# Patient Record
Sex: FEMALE | Race: BLACK OR AFRICAN AMERICAN | Employment: FULL TIME | URBAN - METROPOLITAN AREA
[De-identification: names, ages, dates, MRNs, and addresses within clinical notes are randomized per-mention and may not be internally consistent; named-entity substitution may affect disease eponyms.]

---

## 2017-01-05 RX ORDER — VALSARTAN 160 MG/1
160 TABLET ORAL DAILY
Qty: 30 TAB | Refills: 11 | Status: SHIPPED | OUTPATIENT
Start: 2017-01-05 | End: 2017-01-07 | Stop reason: SDUPTHER

## 2017-01-07 RX ORDER — VALSARTAN 160 MG/1
160 TABLET ORAL DAILY
Qty: 30 TAB | Refills: 11 | Status: SHIPPED | OUTPATIENT
Start: 2017-01-07 | End: 2018-02-02 | Stop reason: SDUPTHER

## 2017-03-21 ENCOUNTER — APPOINTMENT (OUTPATIENT)
Dept: GENERAL RADIOLOGY | Age: 57
End: 2017-03-21
Attending: EMERGENCY MEDICINE
Payer: COMMERCIAL

## 2017-03-21 ENCOUNTER — HOSPITAL ENCOUNTER (EMERGENCY)
Age: 57
Discharge: HOME | End: 2017-03-21
Attending: EMERGENCY MEDICINE
Payer: COMMERCIAL

## 2017-03-21 ENCOUNTER — APPOINTMENT (OUTPATIENT)
Dept: CT IMAGING | Age: 57
End: 2017-03-21
Attending: EMERGENCY MEDICINE
Payer: COMMERCIAL

## 2017-03-21 VITALS
TEMPERATURE: 98.4 F | DIASTOLIC BLOOD PRESSURE: 70 MMHG | SYSTOLIC BLOOD PRESSURE: 135 MMHG | RESPIRATION RATE: 16 BRPM | WEIGHT: 148.4 LBS | HEART RATE: 74 BPM | BODY MASS INDEX: 27.31 KG/M2 | HEIGHT: 62 IN | OXYGEN SATURATION: 97 %

## 2017-03-21 DIAGNOSIS — R00.2 PALPITATIONS: Primary | ICD-10-CM

## 2017-03-21 LAB
ALBUMIN SERPL BCP-MCNC: 4.2 G/DL (ref 3.5–5)
ALBUMIN/GLOB SERPL: 0.9 {RATIO} (ref 1.1–2.2)
ALP SERPL-CCNC: 73 U/L (ref 45–117)
ALT SERPL-CCNC: 22 U/L (ref 12–78)
ANION GAP BLD CALC-SCNC: 9 MMOL/L (ref 5–15)
AST SERPL W P-5'-P-CCNC: 22 U/L (ref 15–37)
ATRIAL RATE: 90 BPM
BASOPHILS # BLD AUTO: 0 K/UL (ref 0–0.1)
BASOPHILS # BLD: 0 % (ref 0–1)
BILIRUB SERPL-MCNC: 1 MG/DL (ref 0.2–1)
BUN SERPL-MCNC: 21 MG/DL (ref 6–20)
BUN/CREAT SERPL: 23 (ref 12–20)
CALCIUM SERPL-MCNC: 9.5 MG/DL (ref 8.5–10.1)
CALCULATED P AXIS, ECG09: 73 DEGREES
CALCULATED R AXIS, ECG10: 85 DEGREES
CALCULATED T AXIS, ECG11: 44 DEGREES
CHLORIDE SERPL-SCNC: 97 MMOL/L (ref 97–108)
CO2 SERPL-SCNC: 27 MMOL/L (ref 21–32)
CREAT SERPL-MCNC: 0.9 MG/DL (ref 0.55–1.02)
DIAGNOSIS, 93000: NORMAL
EOSINOPHIL # BLD: 0 K/UL (ref 0–0.4)
EOSINOPHIL NFR BLD: 0 % (ref 0–7)
ERYTHROCYTE [DISTWIDTH] IN BLOOD BY AUTOMATED COUNT: 12.1 % (ref 11.5–14.5)
GLOBULIN SER CALC-MCNC: 4.7 G/DL (ref 2–4)
GLUCOSE SERPL-MCNC: 88 MG/DL (ref 65–100)
HCT VFR BLD AUTO: 44.3 % (ref 35–47)
HGB BLD-MCNC: 14.5 G/DL (ref 11.5–16)
LYMPHOCYTES # BLD AUTO: 13 % (ref 12–49)
LYMPHOCYTES # BLD: 0.9 K/UL (ref 0.8–3.5)
MCH RBC QN AUTO: 33.3 PG (ref 26–34)
MCHC RBC AUTO-ENTMCNC: 32.7 G/DL (ref 30–36.5)
MCV RBC AUTO: 101.8 FL (ref 80–99)
MONOCYTES # BLD: 0.5 K/UL (ref 0–1)
MONOCYTES NFR BLD AUTO: 7 % (ref 5–13)
NEUTS SEG # BLD: 5.1 K/UL (ref 1.8–8)
NEUTS SEG NFR BLD AUTO: 80 % (ref 32–75)
P-R INTERVAL, ECG05: 196 MS
PLATELET # BLD AUTO: 284 K/UL (ref 150–400)
POTASSIUM SERPL-SCNC: 4 MMOL/L (ref 3.5–5.1)
PROT SERPL-MCNC: 8.9 G/DL (ref 6.4–8.2)
Q-T INTERVAL, ECG07: 364 MS
QRS DURATION, ECG06: 84 MS
QTC CALCULATION (BEZET), ECG08: 445 MS
RBC # BLD AUTO: 4.35 M/UL (ref 3.8–5.2)
SODIUM SERPL-SCNC: 133 MMOL/L (ref 136–145)
TROPONIN I SERPL-MCNC: <0.04 NG/ML
TSH SERPL DL<=0.05 MIU/L-ACNC: 1.34 UIU/ML (ref 0.36–3.74)
VENTRICULAR RATE, ECG03: 90 BPM
WBC # BLD AUTO: 6.5 K/UL (ref 3.6–11)

## 2017-03-21 PROCEDURE — 84443 ASSAY THYROID STIM HORMONE: CPT | Performed by: EMERGENCY MEDICINE

## 2017-03-21 PROCEDURE — 99283 EMERGENCY DEPT VISIT LOW MDM: CPT

## 2017-03-21 PROCEDURE — 93005 ELECTROCARDIOGRAM TRACING: CPT

## 2017-03-21 PROCEDURE — 84484 ASSAY OF TROPONIN QUANT: CPT | Performed by: EMERGENCY MEDICINE

## 2017-03-21 PROCEDURE — 36415 COLL VENOUS BLD VENIPUNCTURE: CPT | Performed by: EMERGENCY MEDICINE

## 2017-03-21 PROCEDURE — 71250 CT THORAX DX C-: CPT

## 2017-03-21 PROCEDURE — 71020 XR CHEST PA LAT: CPT

## 2017-03-21 PROCEDURE — 80053 COMPREHEN METABOLIC PANEL: CPT | Performed by: EMERGENCY MEDICINE

## 2017-03-21 PROCEDURE — 85025 COMPLETE CBC W/AUTO DIFF WBC: CPT | Performed by: EMERGENCY MEDICINE

## 2017-03-21 RX ORDER — LEVOFLOXACIN 750 MG/1
750 TABLET ORAL DAILY
Qty: 7 TAB | Refills: 0 | Status: SHIPPED | OUTPATIENT
Start: 2017-03-21 | End: 2017-03-28

## 2017-03-21 NOTE — ED PROVIDER NOTES
HPI Comments: 56 y.o. female with past medical history significant for HTN, sarcoidosis, prediabetes, PAF, sinus congestion, and s/p colonoscopy who presents from home via private vehicle with chief complaint of palpitations. Pt reports that she woke up \"anxious\" yesterday morning. She went to work but reports \"heavy pounding\" in her chest and that she could see her heart beating. +Associated nausea yesterday. She reports that these palpitations continued throughout the night last night and today. They last for a few hours at a time. Her last episode was 4.5 hours ago. Pt denies any pain, but reports an intermittent \"sharp something\" that \"is not pain\" \"through\" her chest and right side. She notes feeling a \"lump\" in her chest during these episodes of palpitations that feels like she could \"cough away\". Denies cough, however. Pt notes that she was seen for heart palpitations and was dx with A-fib. She is followed by Dr. Bethanie Rasmussen, cardiology, and had been on Eliquis. She has since been taken off Eliquis. Pt currently denies vomiting, rhinorrhea, sore throat, cough, and any urinary or bowel changes. There are no other acute medical concerns at this time. Social hx: Never smoker. Alcohol use. PCP: Sheldon Hollis MD    Note written by Sebastian Segura, as dictated by Nasrin Hooper MD 12:45 PM    The history is provided by the patient.         Past Medical History:   Diagnosis Date    Hypertension     PAF (paroxysmal atrial fibrillation) (HCC)     Prediabetes     S/P colonoscopy 2013    Sarcoidosis ([facility] [address]     Sinus congestion        Past Surgical History:   Procedure Laterality Date    HX COLONOSCOPY      HX ENDOSCOPY      HX GYN           Family History:   Problem Relation Age of Onset    Heart Disease Father        Social History     Social History    Marital status:      Spouse name: N/A    Number of children: N/A    Years of education: N/A     Occupational History    Not on file.     Social History Main Topics    Smoking status: Never Smoker    Smokeless tobacco: Never Used    Alcohol use 0.6 oz/week     1 Glasses of wine per week    Drug use: No    Sexual activity: Not on file     Other Topics Concern    Not on file     Social History Narrative    Family History: Mother: alive, HypertensionFather:  58 yrs, leukemia complicationsSister(s): alive, DMBrother(s): alive, ,    HTNGrandmother: HEENTGrandfather: [place] brother(s) , 1 sister(s) . 2 son(s) . Social History: Alcohol Use Patient uses alcohol, Drinks per occasion: 2, Drinks per w [place]: 2. Smoking Status Patient is a never smoker. Marital Status: , 11 yrs after 17 yr relationship. Lives w ith: spouse. 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 Education/School: has highschool diploma. ALLERGIES: Review of patient's allergies indicates no known allergies. Review of Systems   Constitutional: Negative for chills and fever. HENT: Negative for rhinorrhea and sore throat. Respiratory: Negative for cough and shortness of breath. Cardiovascular: Positive for palpitations. Negative for chest pain. +Chest discomfort   Gastrointestinal: Positive for nausea. Negative for abdominal pain, diarrhea and vomiting. Genitourinary: Negative for dysuria and urgency. Musculoskeletal: Negative for arthralgias and back pain. Skin: Negative for rash. Neurological: Negative for dizziness, weakness and light-headedness. All other systems reviewed and are negative.       Vitals:    17 1139   BP: 165/88   Pulse: 94   Resp: 16   Temp: 98.7 °F (37.1 °C)   SpO2: 99%   Weight: 67.3 kg (148 lb 6.4 oz)   Height: 5' 2\" (1.575 m)            Physical Exam     Const:  No acute distress, well developed, well nourished  Head:  Atraumatic, normocephalic  Eyes:  PERRL, conjunctiva normal, no scleral icterus  Neck:  Supple, trachea midline  Cardiovascular:  RRR, no murmurs, no gallops, no rubs  Resp:  No resp distress, no increased work of breathing, no wheezes, no rhonchi, no rales,  Abd:  Soft, non-tender, non-distended, no rebound, no guarding, no CVA tenderness  :  Deferred  MSK:  No pedal edema, normal ROM  Neuro:  Alert and oriented x3, no cranial nerve defect  Skin:  Warm, dry, intact  Psych: normal mood and affect, behavior is normal, judgement and thought content is normal    Note written by Sebastian Banda, as dictated by Melvin Nicolas MD 12:45 PM      MDM  Number of Diagnoses or Management Options  Palpitations:      Amount and/or Complexity of Data Reviewed  Clinical lab tests: ordered and reviewed  Tests in the radiology section of CPT®: ordered and reviewed  Review and summarize past medical records: yes    Patient Progress  Patient progress: stable    ED Course     Pt. Presents to the ER with palpitations. She has been symptom free in the ER. Chest xray and CT shows signs of infection/inflammation. I will start her on levofloxacin. No signs of arrhythmias on EKG. Pt. To f/u with her cardiologist and PCP or return to the ER with worsening sx. Procedures         EKG interpretation: (Preliminary)  Rhythm: normal sinus rhythm; and regular . Rate (approx.): 90; Axis: normal; P wave: normal; QRS interval: normal ; ST/T wave: non-specific changes; Other findings: borderline ekg.

## 2017-03-21 NOTE — DISCHARGE INSTRUCTIONS
Palpitations: Care Instructions  Your Care Instructions    Heart palpitations are the uncomfortable sensation that your heart is beating fast or irregularly. You might feel pounding or fluttering in your chest. It might feel like your heart is skipping a beat. Although palpitations may be caused by a heart problem, they also occur because of stress, fatigue, or use of alcohol, caffeine, or nicotine. Many medicines, including diet pills, antihistamines, decongestants, and some herbal products, can cause heart palpitations. Nearly everyone has palpitations from time to time. Depending on your symptoms, your doctor may need to do more tests to try to find the cause of your palpitations. Follow-up care is a key part of your treatment and safety. Be sure to make and go to all appointments, and call your doctor if you are having problems. It's also a good idea to know your test results and keep a list of the medicines you take. How can you care for yourself at home? · Avoid caffeine, nicotine, and excess alcohol. · Do not take illegal drugs, such as methamphetamines and cocaine. · Do not take weight loss or diet medicines unless you talk with your doctor first.  · Get plenty of sleep. · Do not overeat. · If you have palpitations again, take deep breaths and try to relax. This may slow a racing heart. · If you start to feel lightheaded, lie down to avoid injuries that might result if you pass out and fall down. · Keep a record of your palpitations and bring it to your next doctor's appointment. Write down:  ¨ The date and time. ¨ Your pulse. (If your heart is beating fast, it may be hard to count your pulse.)  ¨ What you were doing when the palpitations started. ¨ How long the palpitations lasted. ¨ Any other symptoms. · If an activity causes palpitations, slow down or stop. Talk to your doctor before you do that activity again. · Take your medicines exactly as prescribed.  Call your doctor if you think you are having a problem with your medicine. When should you call for help? Call 911 anytime you think you may need emergency care. For example, call if:  · You passed out (lost consciousness). · You have symptoms of a heart attack. These may include:  ¨ Chest pain or pressure, or a strange feeling in the chest.  ¨ Sweating. ¨ Shortness of breath. ¨ Pain, pressure, or a strange feeling in the back, neck, jaw, or upper belly or in one or both shoulders or arms. ¨ Lightheadedness or sudden weakness. ¨ A fast or irregular heartbeat. After you call 911, the  may tell you to chew 1 adult-strength or 2 to 4 low-dose aspirin. Wait for an ambulance. Do not try to drive yourself. · You have symptoms of a stroke. These may include:  ¨ Sudden numbness, tingling, weakness, or loss of movement in your face, arm, or leg, especially on only one side of your body. ¨ Sudden vision changes. ¨ Sudden trouble speaking. ¨ Sudden confusion or trouble understanding simple statements. ¨ Sudden problems with walking or balance. ¨ A sudden, severe headache that is different from past headaches. Call your doctor now or seek immediate medical care if:  · You have heart palpitations and:  ¨ Are dizzy or lightheaded, or you feel like you may faint. ¨ Have new or increased shortness of breath. Watch closely for changes in your health, and be sure to contact your doctor if:  · You continue to have heart palpitations. Where can you learn more? Go to http://gordon-bhavna.info/. Enter R508 in the search box to learn more about \"Palpitations: Care Instructions. \"  Current as of: January 27, 2016  Content Version: 11.1  © 0864-9896 [employer]. Care instructions adapted under license by [employer] (which disclaims liability or warranty for this information).  If you have questions about a medical condition or this instruction, always ask your healthcare professional. [employer] disclaims any warranty or liability for your use of this information. We hope that we have addressed all of your medical concerns. The examination and treatment you received in the Emergency Department were for an emergent problem and were not intended as complete care. It is important that you follow up with your healthcare provider(s) for ongoing care. If your symptoms worsen or do not improve as expected, and you are unable to reach your usual health care provider(s), you should return to the Emergency Department. Today's healthcare is undergoing tremendous change, and patient satisfaction surveys are one of the many tools to assess the quality of medical care. You may receive a survey from the [facility] regarding your experience in the Emergency Department. I hope that your experience has been completely positive, particularly the medical care that I provided. As such, please participate in the survey; anything less than excellent does not meet my expectations or intentions. [facility] and [address] [place] [address] participate in nationally recognized quality of care measures. If your blood pressure is greater than 120/80, as reported below, we urge that you seek medical care to address the potential of high blood pressure, commonly known as hypertension. Hypertension can be hereditary or can be caused by certain medical conditions, pain, stress, or \"white coat syndrome. \"       Please make an appointment with your health care provider(s) for follow up of your Emergency Department visit. VITALS:   Patient Vitals for the past 8 hrs:   Temp Pulse Resp BP SpO2   03/21/17 1403 - 72 16 138/74 98 %   03/21/17 1326 - 77 16 152/87 98 %   03/21/17 1139 98.7 °F (37.1 °C) 94 16 165/88 99 %          Thank you for allowing us to provide you with medical care today. We realize that you have many choices for your emergency care needs.   Please choose us in the future for any continued health care needs. [place], [facility].   Office: [phone]            Recent Results (from the past 24 hour(s))   EKG, 12 LEAD, INITIAL    Collection Time: 03/21/17 11:47 AM   Result Value Ref Range    Ventricular Rate 90 BPM    Atrial Rate 90 BPM    P-R Interval 196 ms    QRS Duration 84 ms    Q-T Interval 364 ms    QTC Calculation (Bezet) 445 ms    Calculated P Axis 73 degrees    Calculated R Axis 85 degrees    Calculated T Axis 44 degrees    Diagnosis       Normal sinus rhythm  When compared with ECG of 15-MARIAJOSE-2015 16:43,  GA interval has decreased  Confirmed by Ulysses Sifuentes MD, Giacomo ([id number]) on 3/21/2017 83:34:10 PM     METABOLIC PANEL, COMPREHENSIVE    Collection Time: 03/21/17 12:30 PM   Result Value Ref Range    Sodium 133 (L) 136 - 145 mmol/L    Potassium 4.0 3.5 - 5.1 mmol/L    Chloride 97 97 - 108 mmol/L    CO2 27 21 - 32 mmol/L    Anion gap 9 5 - 15 mmol/L    Glucose 88 65 - 100 mg/dL    BUN 21 (H) 6 - 20 MG/DL    Creatinine 0.90 0.55 - 1.02 MG/DL    BUN/Creatinine ratio 23 (H) 12 - 20      GFR est AA >60 >60 ml/min/1.73m2    GFR est non-AA >60 >60 ml/min/1.73m2    Calcium 9.5 8.5 - 10.1 MG/DL    Bilirubin, total 1.0 0.2 - 1.0 MG/DL    ALT (SGPT) 22 12 - 78 U/L    AST (SGOT) 22 15 - 37 U/L    Alk.  phosphatase 73 45 - 117 U/L    Protein, total 8.9 (H) 6.4 - 8.2 g/dL    Albumin 4.2 3.5 - 5.0 g/dL    Globulin 4.7 (H) 2.0 - 4.0 g/dL    A-G Ratio 0.9 (L) 1.1 - 2.2     TROPONIN I    Collection Time: 03/21/17 12:30 PM   Result Value Ref Range    Troponin-I, Qt. <0.04 <0.05 ng/mL   TSH 3RD GENERATION    Collection Time: 03/21/17 12:30 PM   Result Value Ref Range    TSH 1.34 0.36 - 3.74 uIU/mL   CBC WITH AUTOMATED DIFF    Collection Time: 03/21/17  1:05 PM   Result Value Ref Range    WBC 6.5 3.6 - 11.0 K/uL    RBC 4.35 3.80 - 5.20 M/uL    HGB 14.5 11.5 - 16.0 g/dL    HCT 44.3 35.0 - 47.0 %    .8 (H) 80.0 - 99.0 FL    MCH 33.3 26.0 - 34.0 PG    MCHC 32.7 30.0 - 36.5 g/dL    RDW 12.1 11.5 - 14.5 %    PLATELET 001 637 - 615 K/uL    NEUTROPHILS 80 (H) 32 - 75 %    LYMPHOCYTES 13 12 - 49 %    MONOCYTES 7 5 - 13 %    EOSINOPHILS 0 0 - 7 %    BASOPHILS 0 0 - 1 %    ABS. NEUTROPHILS 5.1 1.8 - 8.0 K/UL    ABS. LYMPHOCYTES 0.9 0.8 - 3.5 K/UL    ABS. MONOCYTES 0.5 0.0 - 1.0 K/UL    ABS. EOSINOPHILS 0.0 0.0 - 0.4 K/UL    ABS. BASOPHILS 0.0 0.0 - 0.1 K/UL       Xr Chest Pa Lat    Result Date: 3/21/2017  History: Palpitations. 2 views of the chest demonstrate unremarkable cardiomediastinal contours. There are reticular nodular opacities in the upper lobes bilaterally. There are no effusions and the osseous structures appear unremarkable. IMPRESSION: Reticulonodular opacities are seen in the upper lobes bilaterally. Strong clinical correlation is recommended. CT may be useful. Ct Chest Wo Cont    Result Date: 3/21/2017  Clinical history: Palpitations, nodules INDICATION: Palpitations, nodules COMPARISON: TECHNIQUE:  5 mm axial images were obtained through the chest. Coronal and sagittal reconstructions were generated. CT dose reduction was achieved through use of a standardized protocol tailored for this examination and automatic exposure control for dose modulation. The absence of intravenous contrast reduces the sensitivity for evaluation of the mediastinum and upper abdominal organs. FINDINGS: There are diffuse tree in bud nodules scattered throughout the lower and upper lobes with a upper lobe predominance. There are calcified mediastinal and hilar lymph nodes which suggest prior granulomatous infection. Paraesophageal adenopathy as well. Otherwise: THORACIC AORTA: No aneurysm. MAIN PULMONARY ARTERY: Normal in caliber. TRACHEA/BRONCHI: Patent. ESOPHAGUS: No wall thickening or dilatation. HEART: Normal in size. PLEURA: No effusion or pneumothorax. INCIDENTALLY IMAGED UPPER ABDOMEN: No focal abnormality.  BONES: No destructive bone lesion. IMPRESSION: Diffuse tree-in-bud reticulonodular opacities within upper lobe predominance, calcified mediastinal lymph nodes and paraesophageal lymph nodes suggest a prior granulomatous infection. Findings are likely infectious/inflammatory in etiology. May be chronic in nature. Follow-up CT scan of the chest in 3 months for stability/resolution is recommended. Clinical correlation.

## 2017-03-24 ENCOUNTER — OFFICE VISIT (OUTPATIENT)
Dept: INTERNAL MEDICINE CLINIC | Age: 57
End: 2017-03-24

## 2017-03-24 VITALS
TEMPERATURE: 97.3 F | DIASTOLIC BLOOD PRESSURE: 80 MMHG | BODY MASS INDEX: 25.86 KG/M2 | SYSTOLIC BLOOD PRESSURE: 128 MMHG | OXYGEN SATURATION: 98 % | WEIGHT: 140.5 LBS | HEIGHT: 62 IN | RESPIRATION RATE: 16 BRPM | HEART RATE: 89 BPM

## 2017-03-24 DIAGNOSIS — I48.0 PAF (PAROXYSMAL ATRIAL FIBRILLATION) (HCC): ICD-10-CM

## 2017-03-24 DIAGNOSIS — R77.8 ELEVATED TOTAL PROTEIN: ICD-10-CM

## 2017-03-24 DIAGNOSIS — D86.9 SARCOIDOSIS: Primary | ICD-10-CM

## 2017-03-24 DIAGNOSIS — I10 ESSENTIAL HYPERTENSION: ICD-10-CM

## 2017-03-24 NOTE — PROGRESS NOTES
SPORTS MEDICINE AND PRIMARY CARE  Milady Lovell MD, [address] [address]  Phone:  [phone]  Fax: [phone]      Chief Complaint   Patient presents with    Follow-up     Er follow up visit         SUBECTIVE:    Seema Rodriguez is a 56 y.o. female Patient returns today alert and appropriate and has the capacity to give an accurate history. She has a known history of sarcoidosis, prediabetes, paroxysmal atrial fibrillation, and primary hypertension. Since we last saw her she was seen in the emergency room on 3/21/17 by Shavon Young MD with chief complaint of palpitations. The day before she woke up anxious and felt a heavy pounding in her chest and actually could see her heart beating. We recall that she is followed by Dr. Rico Louis, Cardiology and has been on Eliquis and was previously on Eliquis and subsequently discontinued. She apparently had a 48 hour monitor and her CADS-VASC was  and was recommended that she continue off anticoagulants by Dr. Rico Louis. The final medication from cardiac perspective was Metoprolol and Valsartan. While in the emergency room a CBC was unremarkable as well as chemistries except for a total protein of 8.9 and elevated globulin with negative troponin. A CT of the chest was performed and found diffuse tree-in-bud radicular nodular opacity within the upper lobe prominence, calcified mediastinal lymph nodes and periesophageal lymph nodes suggesting prior granulomatous infection. The findings were felt to be infectious/inflammatory in etiology and was felt to be chronic in nature. However to ensure stability and resolution follow-up CT scan of the chest was recommended. Patient returns today feeling better. No cough. She is having chills but does not know if that is associated with her menopausal symptoms. She has had no fever. She feels sleepy today.           Current Outpatient Prescriptions   Medication Sig Dispense Refill    levoFLOXacin (LEVAQUIN) 750 mg tablet Take 1 Tab by mouth daily for 7 days. 7 Tab 0    valsartan (DIOVAN) 160 mg tablet Take 1 Tab by mouth daily. 30 Tab 11    mometasone (NASONEX) 50 mcg/actuation nasal spray 2 Sprays by Both Nostrils route daily. (Patient taking differently: 2 Sprays by Both Nostrils route daily as needed.) 1 Container 11    cetirizine (ZYRTEC) 10 mg tablet Take 1 Tab by mouth daily. (Patient taking differently: Take 10 mg by mouth daily as needed.) 30 Tab 11    metoprolol succinate (TOPROL XL) 25 mg XL tablet Take 1 Tab by mouth daily. 20 Tab 0    aspirin delayed-release 81 mg tablet Take  by mouth daily. Past Medical History:   Diagnosis Date    Hypertension     [facility]     Prediabetes     S/P colonoscopy 2013    [facility] [facility]     Sinus congestion      Past Surgical History:   Procedure Laterality Date    HX COLONOSCOPY      HX ENDOSCOPY      HX GYN       No Known Allergies    REVIEW OF SYSTEMS:   Patient complains of cramps in her legs, particularly when she stretches too much. Social History     Social History    Marital status:      Spouse name: N/A    Number of children: N/A    Years of education: N/A     Social History Main Topics    Smoking status: Never Smoker    Smokeless tobacco: Never Used    Alcohol use 0.6 oz/week     1 Glasses of wine per week    Drug use: No    Sexual activity: Not Asked     Other Topics Concern    None     Social History Narrative    Family History: Mother: alive, HypertensionFather:  58 yrs, leukemia complicationsSister(s): alive, DMBrother(s): alive, ,    HTNGrandmother: HEENTGrandfather: Ana Paula Arredondo brother(s) , 1 sister(s) . 2 son(s) . Social History: Alcohol Use Patient uses alcohol, Drinks per occasion: 2, Drinks per w [place]: 2. Smoking Status Patient is a never smoker. Marital Status: , 11 yrs after 17 yr relationship. Lives w ith: spouse.  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 Education/School: has highschool diploma.   r  Family History   Problem Relation Age of Onset    Heart Disease Father        OBJECTIVE:  Visit Vitals    /80 (BP 1 Location: Left arm, BP Patient Position: Sitting)    Pulse 89    Temp 97.3 °F (36.3 °C) (Oral)    Resp 16    Ht 5' 2\" (1.575 m)    Wt 140 lb 8 oz (63.7 kg)    SpO2 98%    BMI 25.7 kg/m2     ENT: perrla,  eom intact  NECK: supple.  Thyroid normal  CHEST: clear to ascultation and percussion   HEART: regular rate and rhythm  ABD: soft, bowel sounds active  EXTREMITIES: no edema, pulse 1+     Admission on 03/21/2017, Discharged on 03/21/2017   Component Date Value Ref Range Status    Ventricular Rate 03/21/2017 90  BPM Final    Atrial Rate 03/21/2017 90  BPM Final    P-R Interval 03/21/2017 196  ms Final    QRS Duration 03/21/2017 84  ms Final    Q-T Interval 03/21/2017 364  ms Final    QTC Calculation (Bezet) 03/21/2017 445  ms Final    Calculated P Axis 03/21/2017 73  degrees Final    Calculated R Axis 03/21/2017 85  degrees Final    Calculated T Axis 03/21/2017 44  degrees Final    Diagnosis 03/21/2017    Final                    Value:Normal sinus rhythm  When compared with ECG of 15-MARIAJOSE-2015 16:43,  AZ interval has decreased  Confirmed by Sabina Ramos MD, Giacomo ([id number]) on 3/21/2017 12:57:24 PM      Sodium 03/21/2017 133* 136 - 145 mmol/L Final    Potassium 03/21/2017 4.0  3.5 - 5.1 mmol/L Final    Chloride 03/21/2017 97  97 - 108 mmol/L Final    CO2 03/21/2017 27  21 - 32 mmol/L Final    Anion gap 03/21/2017 9  5 - 15 mmol/L Final    Glucose 03/21/2017 88  65 - 100 mg/dL Final    BUN 03/21/2017 21* 6 - 20 MG/DL Final    Creatinine 03/21/2017 0.90  0.55 - 1.02 MG/DL Final    BUN/Creatinine ratio 03/21/2017 23* 12 - 20   Final    GFR est AA 03/21/2017 >60  >60 ml/min/1.73m2 Final    GFR est non-AA 03/21/2017 >60  >60 ml/min/1.73m2 Final    Comment: Estimated GFR is calculated using the IDMS-traceable Modification of Diet in Renal Disease (MDRD) Study equation, reported for both  Americans (GFRAA) and non- Americans (GFRNA), and normalized to 1.73m2 body surface area. The physician must decide which value applies to the patient. The MDRD study equation should only be used in individuals age 25 or older. It has not been validated for the following: pregnant women, patients with serious comorbid conditions, or on certain medications, or persons with extremes of body size, muscle mass, or nutritional status.  Calcium 03/21/2017 9.5  8.5 - 10.1 MG/DL Final    Bilirubin, total 03/21/2017 1.0  0.2 - 1.0 MG/DL Final    ALT (SGPT) 03/21/2017 22  12 - 78 U/L Final    AST (SGOT) 03/21/2017 22  15 - 37 U/L Final    Alk. phosphatase 03/21/2017 73  45 - 117 U/L Final    Protein, total 03/21/2017 8.9* 6.4 - 8.2 g/dL Final    Albumin 03/21/2017 4.2  3.5 - 5.0 g/dL Final    Globulin 03/21/2017 4.7* 2.0 - 4.0 g/dL Final    A-G Ratio 03/21/2017 0.9* 1.1 - 2.2   Final    Troponin-I, Qt. 03/21/2017 <0.04  <0.05 ng/mL Final    Comment: The presence of detectable troponin above the reference range indicates myocardial injury which may be due to ischemia, myocarditis, trauma, etc.  Clinical correlation is necessary to establish the significance of this finding. Sequential testing is recommended to determine if the typical rise and fall of cTnI is demonstrated. Note:  Cardiac troponin I has a relatively long half life and may be present well after the CK MB has returned to baseline. The reference range is based on the 99th percentile of the referent population.  TSH 03/21/2017 1.34  0.36 - 3.74 uIU/mL Final    Comment: (NOTE)  Due to TSH heterogeneity, both structurally and degree of   glycosylation, monoclonal antibodies used in the TSH assay may not   accurately quantitate TSH.  Therefore, this result should be   correlated with clinical findings as well as with other assessments of thyroid function, e.g., free T4, free T3.      WBC 03/21/2017 6.5  3.6 - 11.0 K/uL Final    RBC 03/21/2017 4.35  3.80 - 5.20 M/uL Final    HGB 03/21/2017 14.5  11.5 - 16.0 g/dL Final    HCT 03/21/2017 44.3  35.0 - 47.0 % Final    MCV 03/21/2017 101.8* 80.0 - 99.0 FL Final    MCH 03/21/2017 33.3  26.0 - 34.0 PG Final    MCHC 03/21/2017 32.7  30.0 - 36.5 g/dL Final    RDW 03/21/2017 12.1  11.5 - 14.5 % Final    PLATELET 21/96/1692 032  150 - 400 K/uL Final    NEUTROPHILS 03/21/2017 80* 32 - 75 % Final    LYMPHOCYTES 03/21/2017 13  12 - 49 % Final    MONOCYTES 03/21/2017 7  5 - 13 % Final    EOSINOPHILS 03/21/2017 0  0 - 7 % Final    BASOPHILS 03/21/2017 0  0 - 1 % Final    ABS. NEUTROPHILS 03/21/2017 5.1  1.8 - 8.0 K/UL Final    ABS. LYMPHOCYTES 03/21/2017 0.9  0.8 - 3.5 K/UL Final    ABS. MONOCYTES 03/21/2017 0.5  0.0 - 1.0 K/UL Final    ABS. EOSINOPHILS 03/21/2017 0.0  0.0 - 0.4 K/UL Final    ABS. BASOPHILS 03/21/2017 0.0  0.0 - 0.1 K/UL Final          ASSESSMENT:  1. Sarcoidosis ([facility] [address]    2. PAF (paroxysmal atrial fibrillation) ([facility] [address]    3. Essential hypertension    4. Elevated total protein      Patient's CT scan I suspect is related to her burn-out sarcoid. However, at the recommendation of her radiologist we will repeat the CT scan in three months. Also because she was started on antibiotics we suggest she complete it. We note protein elevation and we will confirm that it is benign elevation probably related to inflammatory or infection process that she went to the emergency room in the first place for. Repeat blood pressure is acceptable as noted above. We will send her the results of our findings. She will come back and see us in the next four to six months, sooner if she has any problems.           PLAN:  .  Orders Placed This Encounter    CT CHEST WO CONT    GAMMOPATHY EVAL, SPEP/NAILA, IG QT/FLC    ANGIOTENSIN CONVERTING ENZYME       Follow-up Disposition:  Return in about 4 months (around 7/24/2017). ATTENTION:   This medical record was transcribed using an electronic medical records system. Although proofread, it may and can contain electronic and spelling errors. Other human spelling and other errors may be present. Corrections may be executed at a later time. Please feel free to contact us for any clarifications as needed.

## 2017-03-24 NOTE — PROGRESS NOTES
1. Have you been to the ER, urgent care clinic since your last visit? Hospitalized since your last visit? Yes Where: [facility]    2. Have you seen or consulted any other health care providers outside of the [facility] since your last visit? Include any pap smears or colon screening.  Yes Reason for visit: Palpitations

## 2017-03-24 NOTE — MR AVS SNAPSHOT
Visit Information Date & Time Provider Department Dept. Phone Encounter #  
 3/24/2017  9:15 AM Domenico Yoon  Sports Medicine and Primary Care [phone] [id number] Follow-up Instructions Return in about 4 months (around 7/24/2017). Follow-up and Disposition History Upcoming Health Maintenance Date Due DTaP/Tdap/Td series (1 - Tdap) 2/9/1983 INFLUENZA AGE 9 TO ADULT 8/1/2016 FOBT Q 1 YEAR AGE 50-75 4/26/2017 BREAST CANCER SCRN MAMMOGRAM 4/13/2018 PAP AKA CERVICAL CYTOLOGY 6/18/2018 Allergies as of 3/24/2017  Review Complete On: 3/24/2017 By: Jhonny Chance MD  
 No Known Allergies Current Immunizations  Never Reviewed No immunizations on file. Not reviewed this visit You Were Diagnosed With   
  
 Codes Comments [facility]    -  Primary ICD-10-CM: Z38.9 ICD-9-CM: 135 PAF (paroxysmal atrial fibrillation) (HCC)     ICD-10-CM: I48.0 ICD-9-CM: 427.31 Essential hypertension     ICD-10-CM: I10 
ICD-9-CM: 401.9 Elevated total protein     ICD-10-CM: R77.8 ICD-9-CM: 790.99 Vitals BP Pulse Temp Resp Height(growth percentile) Weight(growth percentile) 128/80 (BP 1 Location: Right arm, BP Patient Position: Sitting) 89 97.3 °F (36.3 °C) (Oral) 16 5' 2\" (1.575 m) 140 lb 8 oz (63.7 kg) SpO2 BMI OB Status Smoking Status 98% 25.7 kg/m2 Postmenopausal Never Smoker Vitals History BMI and BSA Data Body Mass Index Body Surface Area 25.7 kg/m 2 1.67 m 2 Preferred Pharmacy Pharmacy Name Phone [facility] [address] [facility], [address] [place] [address] [phone] Your Updated Medication List  
  
   
This list is accurate as of: 3/24/17 11:20 AM.  Always use your most recent med list.  
  
  
  
  
 aspirin delayed-release 81 mg tablet Take  by mouth daily. cetirizine 10 mg tablet Commonly known as:  ZYRTEC  
 Take 1 Tab by mouth daily. levoFLOXacin 750 mg tablet Commonly known as:  Janaefransisca Fritz Take 1 Tab by mouth daily for 7 days. metoprolol succinate 25 mg XL tablet Commonly known as:  TOPROL XL Take 1 Tab by mouth daily. mometasone 50 mcg/actuation nasal spray Commonly known as:  NASONEX  
2 Sprays by Both Nostrils route daily. valsartan 160 mg tablet Commonly known as:  DIOVAN Take 1 Tab by mouth daily. We Performed the Following ANGIOTENSIN CONVERTING ENZYME [id number] CPT(R)] Follow-up Instructions Return in about 4 months (around 7/24/2017). To-Do List   
 03/24/2017 Lab:  GAMMOPATHY EVAL, SPEP/NAILA, IG QT/FLC   
  
 06/23/2017 Imaging:  CT CHEST WO CONT Introducing [facility]! [facility] introduces Wave[employer] patient portal. Now you can access parts of your medical record, email your doctor's office, and request medication refills online. 1. In your internet browser, go to [email or web address] 2. Click on the First Time User? Click Here link in the Sign In box. You will see the New Member Sign Up page. 3. Enter your Wave[employer] Access Code exactly as it appears below. You will not need to use this code after youve completed the sign-up process. If you do not sign up before the expiration date, you must request a new code. · Wave[employer]icate Access Code: [email or web address] Expires: 6/19/2017 12:52 PM 
 
4. Enter the last four digits of your Social Security Number (xxxx) and Date of Birth (mm/dd/yyyy) as indicated and click Submit. You will be taken to the next sign-up page. 5. Create a Wave[employer]icate ID. This will be your WaveSyndicate login ID and cannot be changed, so think of one that is secure and easy to remember. 6. Create a [employer] password. You can change your password at any time. 7. Enter your Password Reset Question and Answer. This can be used at a later time if you forget your password. 8. Enter your e-mail address. You will receive e-mail notification when new information is available in [address] 9. Click Sign Up. You can now view and download portions of your medical record. 10. Click the Download Summary menu link to download a portable copy of your medical information. If you have questions, please visit the Frequently Asked Questions section of the [employer] website. Remember, [employer] is NOT to be used for urgent needs. For medical emergencies, dial 911. Now available from your iPhone and Android! Please provide this summary of care documentation to your next provider. Your primary care clinician is listed as Osei Brandt. If you have any questions after today's visit, please call [phone].

## 2017-03-31 LAB
ACE SERPL-CCNC: 32 U/L (ref 14–82)
ALBUMIN SERPL ELPH-MCNC: 4 G/DL (ref 2.9–4.4)
ALBUMIN/GLOB SERPL: 1.1 {RATIO} (ref 0.7–1.7)
ALPHA1 GLOB SERPL ELPH-MCNC: 0.2 G/DL (ref 0–0.4)
ALPHA2 GLOB SERPL ELPH-MCNC: 0.7 G/DL (ref 0.4–1)
B-GLOBULIN SERPL ELPH-MCNC: 1.4 G/DL (ref 0.7–1.3)
GAMMA GLOB SERPL ELPH-MCNC: 1.4 G/DL (ref 0.4–1.8)
GLOBULIN SER-MCNC: 3.7 G/DL (ref 2.2–3.9)
IGA SERPL-MCNC: 252 MG/DL (ref 87–352)
IGG SERPL-MCNC: 1378 MG/DL (ref 700–1600)
IGM SERPL-MCNC: 55 MG/DL (ref 26–217)
INTERPRETATION SERPL IEP-IMP: ABNORMAL
KAPPA LC FREE SER-MCNC: 14.29 MG/L (ref 3.3–19.4)
KAPPA LC FREE/LAMBDA FREE SER: 1.34 {RATIO} (ref 0.26–1.65)
LAMBDA LC FREE SERPL-MCNC: 10.67 MG/L (ref 5.71–26.3)
M PROTEIN SERPL ELPH-MCNC: ABNORMAL G/DL
PLEASE NOTE:, 149534: ABNORMAL
PROT SERPL-MCNC: 7.7 G/DL (ref 6–8.5)

## 2017-07-07 ENCOUNTER — OFFICE VISIT (OUTPATIENT)
Dept: INTERNAL MEDICINE CLINIC | Age: 57
End: 2017-07-07

## 2017-07-07 VITALS
HEART RATE: 75 BPM | HEIGHT: 62 IN | BODY MASS INDEX: 25.82 KG/M2 | RESPIRATION RATE: 18 BRPM | TEMPERATURE: 98.2 F | WEIGHT: 140.3 LBS | OXYGEN SATURATION: 99 % | DIASTOLIC BLOOD PRESSURE: 82 MMHG | SYSTOLIC BLOOD PRESSURE: 128 MMHG

## 2017-07-07 DIAGNOSIS — I48.0 PAF (PAROXYSMAL ATRIAL FIBRILLATION) (HCC): ICD-10-CM

## 2017-07-07 DIAGNOSIS — D86.9 SARCOIDOSIS: ICD-10-CM

## 2017-07-07 DIAGNOSIS — I10 ESSENTIAL HYPERTENSION: Primary | ICD-10-CM

## 2017-07-07 NOTE — MR AVS SNAPSHOT
Visit Information Date & Time Provider Department Dept. Phone Encounter #  
 7/7/2017  9:30 AM Domenico Burrell [facility] Sports Medicine and [place] [address] [id number] Follow-up Instructions Return in about 4 months (around 11/7/2017). Follow-up and Disposition History Upcoming Health Maintenance Date Due FOBT Q 1 YEAR AGE 50-75 4/26/2017 INFLUENZA AGE 9 TO ADULT 8/1/2017 BREAST CANCER SCRN MAMMOGRAM 4/13/2018 PAP AKA CERVICAL CYTOLOGY 6/18/2018 DTaP/Tdap/Td series (2 - Td) 7/7/2027 Allergies as of 7/7/2017  Review Complete On: 7/7/2017 By: Chris Coon MD  
 No Known Allergies Current Immunizations  Never Reviewed No immunizations on file. Not reviewed this visit You Were Diagnosed With   
  
 Codes Comments Essential hypertension    -  Primary ICD-10-CM: I10 
ICD-9-CM: 401.9 PAF (paroxysmal atrial fibrillation) ([facility])     ICD-10-CM: I48.0 ICD-9-CM: 427.31 Sarcoidosis ([address]     ICD-10-CM: D86.9 ICD-9-CM: 135 Vitals BP Pulse Temp Resp Height(growth percentile) Weight(growth percentile) 128/82 (BP 1 Location: Right arm, BP Patient Position: Sitting) 75 98.2 °F (36.8 °C) (Oral) 18 5' 2\" (1.575 m) 140 lb 4.8 oz (63.6 kg) SpO2 BMI OB Status Smoking Status 99% 25.66 kg/m2 Postmenopausal Never Smoker Vitals History BMI and BSA Data Body Mass Index Body Surface Area  
 25.66 kg/m 2 1.67 m 2 Preferred Pharmacy Pharmacy Name Phone [facility] [address], [address] [facility] [phone] Your Updated Medication List  
  
   
This list is accurate as of: 7/7/17 10:39 AM.  Always use your most recent med list.  
  
  
  
  
 aspirin delayed-release 81 mg tablet Take  by mouth daily. cetirizine 10 mg tablet Commonly known as:  ZYRTEC Take 1 Tab by mouth daily. metoprolol succinate 25 mg XL tablet Commonly known as:  TOPROL XL Take 1 Tab by mouth daily. mometasone 50 mcg/actuation nasal spray Commonly known as:  NASONEX  
2 Sprays by Both Nostrils route daily. valsartan 160 mg tablet Commonly known as:  DIOVAN Take 1 Tab by mouth daily. Follow-up Instructions Return in about 4 months (around 11/7/2017). Introducing [facility]! [employer] introduces Globa[employer]li patient portal. Now you can access parts of your medical record, email your doctor's office, and request medication refills online. 1. In your internet browser, go to [email or web address] 2. Click on the First Time User? Click Here link in the Sign In box. You will see the New Member Sign Up page. 3. Enter your Globa[employer]li Access Code exactly as it appears below. You will not need to use this code after youve completed the sign-up process. If you do not sign up before the expiration date, you must request a new code. · Globa.li Access Code: [email or web address] Expires: 9/21/2017  3:31 PM 
 
4. Enter the last four digits of your Social Security Number (xxxx) and Date of Birth (mm/dd/yyyy) as indicated and click Submit. You will be taken to the next sign-up page. 5. Create a Globa[employer]li ID. This will be your Globa.li login ID and cannot be changed, so think of one that is secure and easy to remember. 6. Create a Globa.li password. You can change your password at any time. 7. Enter your Password Reset Question and Answer. This can be used at a later time if you forget your password. 8. Enter your e-mail address. You will receive e-mail notification when new information is available in [address] 9. Click Sign Up. You can now view and download portions of your medical record. 10. Click the Download Summary menu link to download a portable copy of your medical information.  
 
If you have questions, please visit the Frequently Asked Questions section of the [email or web address]. Remember, [email or web address]frhart is NOT to be used for urgent needs. For medical emergencies, dial 911. Now available from your iPhone and Android! Please provide this summary of care documentation to your next provider. Your primary care clinician is listed as Manolo Quiroga. If you have any questions after today's visit, please call [phone].

## 2017-07-07 NOTE — PROGRESS NOTES
SPORTS MEDICINE AND PRIMARY CARE  Aminata Cummings MD, [address] [address]  Phone:  [phone]  Fax: [phone]      Chief Complaint   Patient presents with    Sarcoidosis     follow up          SUBECTIVE:    Bigg May is a 56 y.o. female Patient returns today ambulatory, alert and appropriate and has the capacity to give an accurate history. She has a known history of paroxysmal atrial fibrillation, prediabetes, primary hypertension, sarcoidosis, and is seen for evaluation. Patient returns today voicing no new complaints. She is under stress related to family situation and others including having stress with her son who is now not living with her but living out of the country. His father stays with him periodically. Other new complaints are denied. She is having trouble sleeping and she is using different types of meditation to try to help her sleep. Patient is seen for evaluation. Current Outpatient Prescriptions   Medication Sig Dispense Refill    valsartan (DIOVAN) 160 mg tablet Take 1 Tab by mouth daily. 30 Tab 11    mometasone (NASONEX) 50 mcg/actuation nasal spray 2 Sprays by Both Nostrils route daily. (Patient taking differently: 2 Sprays by Both Nostrils route daily as needed.) 1 Container 11    cetirizine (ZYRTEC) 10 mg tablet Take 1 Tab by mouth daily. (Patient taking differently: Take 10 mg by mouth daily as needed.) 30 Tab 11    metoprolol succinate (TOPROL XL) 25 mg XL tablet Take 1 Tab by mouth daily. 20 Tab 0    aspirin delayed-release 81 mg tablet Take  by mouth daily.        Past Medical History:   Diagnosis Date    Hypertension     [facility]     Prediabetes     S/P colonoscopy 2013    Sarcoidosis ([facility]     Sinus congestion      Past Surgical History:   Procedure Laterality Date    HX COLONOSCOPY      HX ENDOSCOPY      HX GYN       No Known Allergies    REVIEW OF SYSTEMS:   No chest pain.  No shortness of breath. Social History     Social History    Marital status:      Spouse name: N/A    Number of children: N/A    Years of education: N/A     Social History Main Topics    Smoking status: Never Smoker    Smokeless tobacco: Never Used    Alcohol use 0.6 oz/week     1 Glasses of wine per week    Drug use: No    Sexual activity: Yes     Other Topics Concern    None     Social History Narrative    Family History: Mother: alive, HypertensionFather:  58 yrs, leukemia complicationsSister(s): alive, DMBrother(s): alive, ,    HTNGrandmother: HEENTGrandfather: Allen Leavitt brother(s) , 1 sister(s) . 2 son(s) . Social History: Alcohol Use Patient uses alcohol, Drinks per occasion: 2, Drinks per w [place]: 2. Smoking Status Patient is a never smoker. Marital Status: , 11 yrs after 17 yr relationship. Lives w ith: spouse. 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 Education/School: has highschool diploma.   r  Family History   Problem Relation Age of Onset    Heart Disease Father        OBJECTIVE:  Visit Vitals    /82 (BP 1 Location: Right arm, BP Patient Position: Sitting)    Pulse 75    Temp 98.2 °F (36.8 °C) (Oral)    Resp 18    Ht 5' 2\" (1.575 m)    Wt 140 lb 4.8 oz (63.6 kg)    SpO2 99%    BMI 25.66 kg/m2     ENT: perrla,  eom intact  NECK: supple. Thyroid normal  CHEST: clear to ascultation and percussion   HEART: regular rate and rhythm  ABD: soft, bowel sounds active  EXTREMITIES: no edema, pulse 1+     No visits with results within 3 Month(s) from this visit.   Latest known visit with results is:    Office Visit on 2017   Component Date Value Ref Range Status    Angiotensin Converting Enzyme (ACE) 2017 32  14 - 82 U/L Final    Immunoglobulin G, Qt. 2017 1378  700 - 1600 mg/dL Final    Immunoglobulin A, Qt. 2017 252  87 - 352 mg/dL Final    Immunoglobulin M, Qt. 2017 55  26 - 217 mg/dL Final    Protein, total 03/24/2017 7.7  6.0 - 8.5 g/dL Final    Albumin 03/24/2017 4.0  2.9 - 4.4 g/dL Final    Alpha-1-Globulin 03/24/2017 0.2  0.0 - 0.4 g/dL Final    Alpha-2-Globulin 03/24/2017 0.7  0.4 - 1.0 g/dL Final    Beta Globulin 03/24/2017 1.4* 0.7 - 1.3 g/dL Final    Gamma Globulin 03/24/2017 1.4  0.4 - 1.8 g/dL Final    M-Willie 03/24/2017 Not Observed  Not Observed g/dL Final    Globulin, total 03/24/2017 3.7  2.2 - 3.9 g/dL Final    A/G ratio 03/24/2017 1.1  0.7 - 1.7 Final    Immunofixation Result 03/24/2017 Comment   Final    An apparent normal immunofixation pattern.  Please note 03/24/2017 Comment   Final    Comment: Protein electrophoresis scan will follow via computer, mail, or   delivery.  Free Kappa Lt Chains, serum 03/24/2017 14.29  3.30 - 19.40 mg/L Final    Free Lambda Lt Chains, serum 03/24/2017 10.67  5.71 - 26.30 mg/L Final    Kappa/Lambda ratio, serum 03/24/2017 1.34  0.26 - 1.65 Final          ASSESSMENT:  1. Essential hypertension    2. PAF (paroxysmal atrial fibrillation) (HCC)    3. Sarcoidosis ([address]      We were concerned about the elevated protein noted on previous visit. We did a gammopathy evaluation which was unremarkable. In fact the repeat protein was normal and there was no M spike in evaluation. This is reassuring. Blood pressure control is at goal.      Her BMI is at her ideal body weight. We encourage physical activity for 30 minutes five days a week. She will return to the office in four to six months or as needed. PLAN:  . No orders of the defined types were placed in this encounter. Follow-up Disposition:  Return in about 4 months (around 11/7/2017). ATTENTION:   This medical record was transcribed using an electronic medical records system. Although proofread, it may and can contain electronic and spelling errors. Other human spelling and other errors may be present. Corrections may be executed at a later time.   Please feel free to contact us for any clarifications as needed.

## 2017-07-07 NOTE — PROGRESS NOTES
Chief Complaint   Patient presents with    Sarcoidosis     follow up      1. Have you been to the ER, urgent care clinic since your last visit? Hospitalized since your last visit? No    2. Have you seen or consulted any other health care providers outside of the [address] [place] [address] since your last visit? Include any pap smears or colon screening.  No

## 2018-02-02 RX ORDER — VALSARTAN 160 MG/1
160 TABLET ORAL DAILY
Qty: 30 TAB | Refills: 4 | Status: SHIPPED | OUTPATIENT
Start: 2018-02-02 | End: 2018-06-05 | Stop reason: SDUPTHER

## 2018-06-05 ENCOUNTER — OFFICE VISIT (OUTPATIENT)
Dept: INTERNAL MEDICINE CLINIC | Age: 58
End: 2018-06-05

## 2018-06-05 VITALS
RESPIRATION RATE: 18 BRPM | HEIGHT: 62 IN | DIASTOLIC BLOOD PRESSURE: 78 MMHG | HEART RATE: 88 BPM | BODY MASS INDEX: 26.13 KG/M2 | WEIGHT: 142 LBS | TEMPERATURE: 97.8 F | SYSTOLIC BLOOD PRESSURE: 127 MMHG | OXYGEN SATURATION: 98 %

## 2018-06-05 DIAGNOSIS — Z00.00 PREVENTATIVE HEALTH CARE: Primary | ICD-10-CM

## 2018-06-05 DIAGNOSIS — I10 ESSENTIAL HYPERTENSION: ICD-10-CM

## 2018-06-05 DIAGNOSIS — H93.12 TINNITUS OF LEFT EAR: ICD-10-CM

## 2018-06-05 DIAGNOSIS — J84.9 INTERSTITIAL LUNG DISEASE (HCC): ICD-10-CM

## 2018-06-05 DIAGNOSIS — I48.0 PAF (PAROXYSMAL ATRIAL FIBRILLATION) (HCC): ICD-10-CM

## 2018-06-05 PROBLEM — H93.19 TINNITUS: Status: ACTIVE | Noted: 2018-06-04

## 2018-06-05 RX ORDER — ASPIRIN 81 MG/1
81 TABLET ORAL DAILY
Qty: 30 TAB | Refills: 11 | Status: SHIPPED | OUTPATIENT
Start: 2018-06-05 | End: 2019-09-26 | Stop reason: SDUPTHER

## 2018-06-05 RX ORDER — METOPROLOL SUCCINATE 25 MG/1
25 TABLET, EXTENDED RELEASE ORAL DAILY
Qty: 30 TAB | Refills: 11 | Status: SHIPPED | OUTPATIENT
Start: 2018-06-05 | End: 2019-07-19 | Stop reason: SDUPTHER

## 2018-06-05 RX ORDER — VALSARTAN 160 MG/1
160 TABLET ORAL DAILY
Qty: 30 TAB | Refills: 11 | Status: SHIPPED | OUTPATIENT
Start: 2018-06-05 | End: 2018-07-25

## 2018-06-05 NOTE — MR AVS SNAPSHOT
[facility] 
 
 
 . [address] [id number] 
[phone] Patient: Chelsea Kelley MRN: [id number] JFV:[id number] Visit Information Date & Time Provider Department Dept. Phone Encounter #  
 6/5/2018  9:45 AM Garcia Koch MD [facility] [place] [address] [id number] Follow-up Instructions Return in about 1 year (around 6/5/2019). Follow-up and Disposition History Upcoming Health Maintenance Date Due  
 BREAST CANCER SCRN MAMMOGRAM 4/13/2018 PAP AKA CERVICAL CYTOLOGY 6/18/2018 Influenza Age 5 to Adult 8/1/2018 COLONOSCOPY 11/25/2022 DTaP/Tdap/Td series (2 - Td) 7/7/2027 Allergies as of 6/5/2018  Review Complete On: 6/5/2018 By: Garcia Koch MD  
 No Known Allergies Current Immunizations  Never Reviewed No immunizations on file. Not reviewed this visit You Were Diagnosed With   
  
 Codes Comments Preventative health care    -  Primary ICD-10-CM: Z00.00 ICD-9-CM: V70.0 Tinnitus of left ear     ICD-10-CM: H93.12 
ICD-9-CM: 388.30 Interstitial lung disease ([facility]     ICD-10-CM: J84.9 ICD-9-CM: 973 PAF (paroxysmal atrial fibrillation) (HCC)     ICD-10-CM: I48.0 ICD-9-CM: 427.31 Essential hypertension     ICD-10-CM: I10 
ICD-9-CM: 401.9 Vitals BP Pulse Temp Resp Height(growth percentile) Weight(growth percentile) 127/78 88 97.8 °F (36.6 °C) (Oral) 18 5' 2\" (1.575 m) 142 lb (64.4 kg) SpO2 BMI OB Status Smoking Status 98% 25.97 kg/m2 Postmenopausal Never Smoker Vitals History BMI and BSA Data Body Mass Index Body Surface Area  
 25.97 kg/m 2 1.68 m 2 Preferred Pharmacy Pharmacy Name Phone [facility] [address], [address] [facility] [phone] Your Updated Medication List  
  
   
 This list is accurate as of 6/5/18 11:13 AM.  Always use your most recent med list.  
  
  
  
  
 aspirin delayed-release 81 mg tablet Take 1 Tab by mouth daily. metoprolol succinate 25 mg XL tablet Commonly known as:  TOPROL XL Take 1 Tab by mouth daily. valsartan 160 mg tablet Commonly known as:  DIOVAN Take 1 Tab by mouth daily. Prescriptions Sent to Pharmacy Refills  
 valsartan (DIOVAN) 160 mg tablet 11 Sig: Take 1 Tab by mouth daily. Class: Normal  
 Pharmacy: [address] Ph #: [phone] Route: Oral  
 metoprolol succinate (TOPROL XL) 25 mg XL tablet 11 Sig: Take 1 Tab by mouth daily. Class: Normal  
 Pharmacy: [address] Ph #: [phone] Route: Oral  
 aspirin delayed-release 81 mg tablet 11 Sig: Take 1 Tab by mouth daily. Class: Normal  
 Pharmacy: [address] Ph #: [phone] Route: Oral  
  
We Performed the Following CBC WITH AUTOMATED DIFF [32707 CPT(R)] COLLECTION VENOUS BLOOD,VENIPUNCTURE [id number] CPT(R)] HEMOGLOBIN A1C WITH EAG [13094 CPT(R)] LIPID PANEL [28235 CPT(R)] MAGNESIUM [id number] CPT(R)] METABOLIC PANEL, COMPREHENSIVE [79365 CPT(R)] TSH 3RD GENERATION [43345 CPT(R)] URINALYSIS W/ RFLX MICROSCOPIC [38262 CPT(R)] Follow-up Instructions Return in about 1 year (around 6/5/2019). To-Do List   
 06/05/2018 Imaging:  CT CHEST WO CONT Introducing [facility]! [facility] introduces [employer] patient portal. Now you can access parts of your medical record, email your doctor's office, and request medication refills online. 1. In your internet browser, go to [email or web address] 2. Click on the First Time User? Click Here link in the Sign In box. You will see the New Member Sign Up page. 3. Enter your [employer] Access Code exactly as it appears below. You will not need to use this code after youve completed the sign-up process. If you do not sign up before the expiration date, you must request a new code. · [employer] Access Code: [email or web address] Expires: 9/3/2018 11:13 AM 
 
4. Enter the last four digits of your Social Security Number (xxxx) and Date of Birth (mm/dd/yyyy) as indicated and click Submit. You will be taken to the next sign-up page. 5. Create a [employer] ID. This will be your [employer] login ID and cannot be changed, so think of one that is secure and easy to remember. 6. Create a [employer] password. You can change your password at any time. 7. Enter your Password Reset Question and Answer. This can be used at a later time if you forget your password. 8. Enter your e-mail address. You will receive e-mail notification when new information is available in [address] 9. Click Sign Up. You can now view and download portions of your medical record. 10. Click the Download Summary menu link to download a portable copy of your medical information. If you have questions, please visit the Frequently Asked Questions section of the [employer] website. Remember, [employer] is NOT to be used for urgent needs. For medical emergencies, dial 911. Now available from your iPhone and Android! Please provide this summary of care documentation to your next provider. Your primary care clinician is listed as [facility]. If you have any questions after today's visit, please call [phone].

## 2018-06-05 NOTE — PROGRESS NOTES
1. Have you been to the ER, urgent care clinic since your last visit? Hospitalized since your last visit? No    2. Have you seen or consulted any other health care providers outside of the [address] [place] [address] since your last visit? Include any pap smears or colon screening.  No     Ringing in ears

## 2018-06-05 NOTE — PROGRESS NOTES
SPORTS MEDICINE AND PRIMARY CARE  Claudia Florian MD, [address] [address]  Phone:  [phone]  Fax: [phone]       Chief Complaint   Patient presents with    Annual Exam   .      SUBJECTIVE:    Jude Conde is a 66 y.o. female Patient returns today with known history of primary hypertension, sinusitis, paroxysmal atrial fibrillation, followed by Dr. Jenniffer Burr, prediabetes, sarcoidosis. 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 The ENT doctor's location is [facility]. Other new complaints denied and patient is seen for evaluation. She had a pap smear by Dr. Alethea Sims on the 25th of May and also had mammograms at [facility] on that same day. Current Outpatient Prescriptions   Medication Sig Dispense Refill    valsartan (DIOVAN) 160 mg tablet Take 1 Tab by mouth daily. 30 Tab 4    mometasone (NASONEX) 50 mcg/actuation nasal spray 2 Sprays by Both Nostrils route daily. (Patient taking differently: 2 Sprays by Both Nostrils route daily as needed.) 1 Container 11    cetirizine (ZYRTEC) 10 mg tablet Take 1 Tab by mouth daily. (Patient taking differently: Take 10 mg by mouth daily as needed.) 30 Tab 11    metoprolol succinate (TOPROL XL) 25 mg XL tablet Take 1 Tab by mouth daily. 20 Tab 0    aspirin delayed-release 81 mg tablet Take  by mouth daily.        Past Medical History:   Diagnosis Date    Hypertension     Interstitial lung disease ([address]     PAF (paroxysmal atrial fibrillation) ([facility] [address]     Prediabetes     Preventative health care     S/P colonoscopy 2013    Sarcoidosis     Sinus congestion     Tinnitus 06/04/2018     Past Surgical History:   Procedure Laterality Date    HX COLONOSCOPY      HX ENDOSCOPY      HX GYN       No Known Allergies      REVIEW OF SYSTEMS:  General: negative for - chills or fever  ENT: negative for - headaches, nasal congestion or tinnitus  Respiratory: negative for - cough, hemoptysis, shortness of breath or wheezing  Cardiovascular : negative for - chest pain, edema, palpitations or shortness of breath  Gastrointestinal: negative for - abdominal pain, blood in stools, heartburn or nausea/vomiting  Genito-Urinary: no dysuria, trouble voiding, or hematuria  Musculoskeletal: negative for - gait disturbance, joint pain, joint stiffness or joint swelling  Neurological: no TIA or stroke symptoms  Hematologic: no bruises, no bleeding, no swollen glands  Integument: no lumps, mole changes, nail changes or rash  Endocrine: no malaise/lethargy or unexpected weight changes      Social History     Social History    Marital status:      Spouse name: N/A    Number of children: N/A    Years of education: N/A     Social History Main Topics    Smoking status: Never Smoker    Smokeless tobacco: Never Used    Alcohol use 0.6 oz/week     1 Glasses of wine per week      Comment: weekends    Drug use: No    Sexual activity: Yes     Partners: Male     Birth control/ protection: None     Other Topics Concern    None     Social History Narrative    Family History: Mother: alive, HypertensionFather:  58 yrs, leukemia complicationsSister(s): alive, DMBrother(s): alive, ,    HTNGrandmother: HEENTGrandfather: Guardiumn Music brother(s) , 1 sister(s) . 2 son 28truck . 21 works ith dad(s) 3 grands . Social History: Alcohol Use Patient uses alcohol, Drinks per occasion: 2, Drinks per w [place]: 2. Smoking Status Patient is a never smoker. Marital Status: , 11 yrs after 17 yr relationship. Lives w ith: spouse. 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 Education/School: has highschool diploma.      Family History   Problem Relation Age of Onset    Heart Disease Father        OBJECTIVE:    Visit Vitals    /78    Pulse 88    Temp 97.8 °F (36.6 °C) (Oral)    Resp 18    Ht 5' 2\" (1.575 m)    Wt 142 lb (64.4 kg)    SpO2 98%    BMI 25.97 kg/m2     CONSTITUTIONAL: well , well nourished, appears age appropriate  EYES: perrla, eom intact  ENMT:moist mucous membranes, pharynx clear  NECK: supple. Thyroid normal  RESPIRATORY: Chest: clear bilaterally   CARDIOVASCULAR: Heart: regular rate and rhythm  GASTROINTESTINAL: Abdomen: soft, bowel sounds active  HEMATOLOGIC: no pathological lymph nodes palpated  MUSCULOSKELETAL: Extremities: no edema, pulse 1+   INTEGUMENT: No unusual rashes or suspicious skin lesions noted. Nails appear normal.  NEUROLOGIC: non-focal exam   MENTAL STATUS: alert and oriented, appropriate affect           ASSESSMENT:  1. Preventative health care    2. Tinnitus of left ear    3. Interstitial lung disease ([address]    4. PAF (paroxysmal atrial fibrillation) ([address]    5. Essential hypertension      Patient's medical status is generally stable. Her BMI reflects ideal body weight. Her blood pressure reflects goal.      We encourage her to let Dr. Haylee Shanks know of the ENT doctor's findings. She's off the anticoagulants, but complaining of some skipped beats or extra beats. Since she's going to see Dr. Haylee Shanks on Friday we will not request a 24 hour Holter monitor. Appropriate lab studies will be requested and will send them to him. She'll return to see us formally in one year. She's invited to walk in to see us at any time if she is unable to get an appointment and needs to see us in an urgent manner. We encouraged physical activity 30 minutes five days a week and a heart healthy diet. PLAN:  .  Orders Placed This Encounter    CT CHEST WO CONT    URINALYSIS W/ RFLX MICROSCOPIC    CBC WITH AUTOMATED DIFF    METABOLIC PANEL, COMPREHENSIVE    LIPID PANEL    TSH 3RD GENERATION    HEMOGLOBIN A1C WITH EAG    MAGNESIUM       Follow-up Disposition:  Return in about 1 year (around 6/5/2019).       ATTENTION:   This medical record was transcribed using an electronic medical records system. Although proofread, it may and can contain electronic and spelling errors. Other human spelling and other errors may be present. Corrections may be executed at a later time. Please feel free to contact us for any clarifications as needed.

## 2018-06-06 LAB
ALBUMIN SERPL-MCNC: 4.5 G/DL (ref 3.5–5.5)
ALBUMIN/GLOB SERPL: 1.6 {RATIO} (ref 1.2–2.2)
ALP SERPL-CCNC: 55 IU/L (ref 39–117)
ALT SERPL-CCNC: 13 IU/L (ref 0–32)
APPEARANCE UR: CLEAR
AST SERPL-CCNC: 21 IU/L (ref 0–40)
BASOPHILS # BLD AUTO: 0 X10E3/UL (ref 0–0.2)
BASOPHILS NFR BLD AUTO: 0 %
BILIRUB SERPL-MCNC: 0.3 MG/DL (ref 0–1.2)
BILIRUB UR QL STRIP: NEGATIVE
BUN SERPL-MCNC: 12 MG/DL (ref 6–24)
BUN/CREAT SERPL: 17 (ref 9–23)
CALCIUM SERPL-MCNC: 9.8 MG/DL (ref 8.7–10.2)
CHLORIDE SERPL-SCNC: 101 MMOL/L (ref 96–106)
CHOLEST SERPL-MCNC: 215 MG/DL (ref 100–199)
CO2 SERPL-SCNC: 27 MMOL/L (ref 18–29)
COLOR UR: YELLOW
CREAT SERPL-MCNC: 0.71 MG/DL (ref 0.57–1)
EOSINOPHIL # BLD AUTO: 0 X10E3/UL (ref 0–0.4)
EOSINOPHIL NFR BLD AUTO: 1 %
ERYTHROCYTE [DISTWIDTH] IN BLOOD BY AUTOMATED COUNT: 12.9 % (ref 12.3–15.4)
EST. AVERAGE GLUCOSE BLD GHB EST-MCNC: 103 MG/DL
GFR SERPLBLD CREATININE-BSD FMLA CKD-EPI: 110 ML/MIN/1.73
GFR SERPLBLD CREATININE-BSD FMLA CKD-EPI: 96 ML/MIN/1.73
GLOBULIN SER CALC-MCNC: 2.9 G/DL (ref 1.5–4.5)
GLUCOSE SERPL-MCNC: 87 MG/DL (ref 65–99)
GLUCOSE UR QL: NEGATIVE
HBA1C MFR BLD: 5.2 % (ref 4.8–5.6)
HCT VFR BLD AUTO: 41.8 % (ref 34–46.6)
HDLC SERPL-MCNC: 117 MG/DL
HGB BLD-MCNC: 13.5 G/DL (ref 11.1–15.9)
HGB UR QL STRIP: NEGATIVE
IMM GRANULOCYTES # BLD: 0 X10E3/UL (ref 0–0.1)
IMM GRANULOCYTES NFR BLD: 0 %
KETONES UR QL STRIP: NEGATIVE
LDLC SERPL CALC-MCNC: 85 MG/DL (ref 0–99)
LEUKOCYTE ESTERASE UR QL STRIP: NEGATIVE
LYMPHOCYTES # BLD AUTO: 0.8 X10E3/UL (ref 0.7–3.1)
LYMPHOCYTES NFR BLD AUTO: 15 %
MAGNESIUM SERPL-MCNC: 1.6 MG/DL (ref 1.6–2.3)
MCH RBC QN AUTO: 31.8 PG (ref 26.6–33)
MCHC RBC AUTO-ENTMCNC: 32.3 G/DL (ref 31.5–35.7)
MCV RBC AUTO: 98 FL (ref 79–97)
MICRO URNS: NORMAL
MONOCYTES # BLD AUTO: 0.4 X10E3/UL (ref 0.1–0.9)
MONOCYTES NFR BLD AUTO: 7 %
NEUTROPHILS # BLD AUTO: 4.5 X10E3/UL (ref 1.4–7)
NEUTROPHILS NFR BLD AUTO: 77 %
NITRITE UR QL STRIP: NEGATIVE
PH UR STRIP: 5 [PH] (ref 5–7.5)
PLATELET # BLD AUTO: 286 X10E3/UL (ref 150–379)
POTASSIUM SERPL-SCNC: 4.5 MMOL/L (ref 3.5–5.2)
PROT SERPL-MCNC: 7.4 G/DL (ref 6–8.5)
PROT UR QL STRIP: NEGATIVE
RBC # BLD AUTO: 4.25 X10E6/UL (ref 3.77–5.28)
SODIUM SERPL-SCNC: 142 MMOL/L (ref 134–144)
SP GR UR: 1.02 (ref 1–1.03)
TRIGL SERPL-MCNC: 67 MG/DL (ref 0–149)
TSH SERPL DL<=0.005 MIU/L-ACNC: 1.15 UIU/ML (ref 0.45–4.5)
UROBILINOGEN UR STRIP-MCNC: 0.2 MG/DL (ref 0.2–1)
VLDLC SERPL CALC-MCNC: 13 MG/DL (ref 5–40)
WBC # BLD AUTO: 5.8 X10E3/UL (ref 3.4–10.8)

## 2018-06-29 ENCOUNTER — HOSPITAL ENCOUNTER (OUTPATIENT)
Dept: CT IMAGING | Age: 58
Discharge: HOME | End: 2018-06-29
Attending: INTERNAL MEDICINE
Payer: COMMERCIAL

## 2018-06-29 DIAGNOSIS — J84.9 INTERSTITIAL LUNG DISEASE (HCC): ICD-10-CM

## 2018-06-29 PROCEDURE — 71250 CT THORAX DX C-: CPT

## 2018-07-25 RX ORDER — OLMESARTAN MEDOXOMIL 40 MG/1
40 TABLET ORAL DAILY
Qty: 30 TAB | Refills: 11 | Status: SHIPPED | OUTPATIENT
Start: 2018-07-25 | End: 2019-09-26 | Stop reason: SDUPTHER

## 2019-06-07 ENCOUNTER — OFFICE VISIT (OUTPATIENT)
Dept: INTERNAL MEDICINE CLINIC | Age: 59
End: 2019-06-07

## 2019-06-07 VITALS
SYSTOLIC BLOOD PRESSURE: 138 MMHG | DIASTOLIC BLOOD PRESSURE: 83 MMHG | OXYGEN SATURATION: 98 % | HEIGHT: 62 IN | WEIGHT: 137.9 LBS | RESPIRATION RATE: 16 BRPM | HEART RATE: 70 BPM | TEMPERATURE: 97.6 F | BODY MASS INDEX: 25.38 KG/M2

## 2019-06-07 DIAGNOSIS — D86.9 SARCOIDOSIS: ICD-10-CM

## 2019-06-07 DIAGNOSIS — I10 ESSENTIAL HYPERTENSION: ICD-10-CM

## 2019-06-07 DIAGNOSIS — I48.0 PAF (PAROXYSMAL ATRIAL FIBRILLATION) (HCC): ICD-10-CM

## 2019-06-07 DIAGNOSIS — R73.03 PREDIABETES: Primary | ICD-10-CM

## 2019-06-07 DIAGNOSIS — J84.9 INTERSTITIAL LUNG DISEASE (HCC): ICD-10-CM

## 2019-06-07 DIAGNOSIS — Z00.00 PREVENTATIVE HEALTH CARE: ICD-10-CM

## 2019-06-07 NOTE — PROGRESS NOTES
1. Have you been to the ER, urgent care clinic since your last visit? Hospitalized since your last visit? No    2. Have you seen or consulted any other health care providers outside of the [address] [place] [address] since your last visit? Include any pap smears or colon screening.  No     Wants to discuss pin like feeling in legs and foot
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
## 2019-06-08 LAB
ALBUMIN SERPL-MCNC: 5 G/DL (ref 3.5–5.5)
ALBUMIN/GLOB SERPL: 1.6 {RATIO} (ref 1.2–2.2)
ALP SERPL-CCNC: 54 IU/L (ref 39–117)
ALT SERPL-CCNC: 12 IU/L (ref 0–32)
APPEARANCE UR: CLEAR
AST SERPL-CCNC: 20 IU/L (ref 0–40)
BASOPHILS # BLD AUTO: 0 X10E3/UL (ref 0–0.2)
BASOPHILS NFR BLD AUTO: 0 %
BILIRUB SERPL-MCNC: 0.4 MG/DL (ref 0–1.2)
BILIRUB UR QL STRIP: NEGATIVE
BUN SERPL-MCNC: 9 MG/DL (ref 6–24)
BUN/CREAT SERPL: 12 (ref 9–23)
CALCIUM SERPL-MCNC: 10.1 MG/DL (ref 8.7–10.2)
CHLORIDE SERPL-SCNC: 101 MMOL/L (ref 96–106)
CHOLEST SERPL-MCNC: 252 MG/DL (ref 100–199)
CO2 SERPL-SCNC: 24 MMOL/L (ref 20–29)
COLOR UR: YELLOW
CREAT SERPL-MCNC: 0.75 MG/DL (ref 0.57–1)
EOSINOPHIL # BLD AUTO: 0.1 X10E3/UL (ref 0–0.4)
EOSINOPHIL NFR BLD AUTO: 1 %
ERYTHROCYTE [DISTWIDTH] IN BLOOD BY AUTOMATED COUNT: 13 % (ref 12.3–15.4)
EST. AVERAGE GLUCOSE BLD GHB EST-MCNC: 105 MG/DL
GLOBULIN SER CALC-MCNC: 3.1 G/DL (ref 1.5–4.5)
GLUCOSE SERPL-MCNC: 81 MG/DL (ref 65–99)
GLUCOSE UR QL: NEGATIVE
HBA1C MFR BLD: 5.3 % (ref 4.8–5.6)
HCT VFR BLD AUTO: 41.1 % (ref 34–46.6)
HDLC SERPL-MCNC: 135 MG/DL
HGB BLD-MCNC: 13.6 G/DL (ref 11.1–15.9)
HGB UR QL STRIP: NEGATIVE
IMM GRANULOCYTES # BLD AUTO: 0 X10E3/UL (ref 0–0.1)
IMM GRANULOCYTES NFR BLD AUTO: 0 %
KETONES UR QL STRIP: NEGATIVE
LDLC SERPL CALC-MCNC: 105 MG/DL (ref 0–99)
LEUKOCYTE ESTERASE UR QL STRIP: NEGATIVE
LYMPHOCYTES # BLD AUTO: 1.6 X10E3/UL (ref 0.7–3.1)
LYMPHOCYTES NFR BLD AUTO: 28 %
MCH RBC QN AUTO: 33.5 PG (ref 26.6–33)
MCHC RBC AUTO-ENTMCNC: 33.1 G/DL (ref 31.5–35.7)
MCV RBC AUTO: 101 FL (ref 79–97)
MICRO URNS: NORMAL
MONOCYTES # BLD AUTO: 0.7 X10E3/UL (ref 0.1–0.9)
MONOCYTES NFR BLD AUTO: 13 %
NEUTROPHILS # BLD AUTO: 3.3 X10E3/UL (ref 1.4–7)
NEUTROPHILS NFR BLD AUTO: 58 %
NITRITE UR QL STRIP: NEGATIVE
PH UR STRIP: 5.5 [PH] (ref 5–7.5)
PLATELET # BLD AUTO: 316 X10E3/UL (ref 150–450)
POTASSIUM SERPL-SCNC: 4.4 MMOL/L (ref 3.5–5.2)
PROT SERPL-MCNC: 8.1 G/DL (ref 6–8.5)
PROT UR QL STRIP: NEGATIVE
RBC # BLD AUTO: 4.06 X10E6/UL (ref 3.77–5.28)
SODIUM SERPL-SCNC: 142 MMOL/L (ref 134–144)
SP GR UR: 1.02 (ref 1–1.03)
TRIGL SERPL-MCNC: 62 MG/DL (ref 0–149)
TSH SERPL DL<=0.005 MIU/L-ACNC: 1.51 UIU/ML (ref 0.45–4.5)
UROBILINOGEN UR STRIP-MCNC: 0.2 MG/DL (ref 0.2–1)
VLDLC SERPL CALC-MCNC: 12 MG/DL (ref 5–40)
WBC # BLD AUTO: 5.7 X10E3/UL (ref 3.4–10.8)

## 2019-07-19 RX ORDER — METOPROLOL SUCCINATE 25 MG/1
25 TABLET, EXTENDED RELEASE ORAL DAILY
Qty: 30 TAB | Refills: 11 | Status: SHIPPED | OUTPATIENT
Start: 2019-07-19 | End: 2020-06-12 | Stop reason: SDUPTHER

## 2019-09-26 RX ORDER — OLMESARTAN MEDOXOMIL 40 MG/1
40 TABLET ORAL DAILY
Qty: 30 TAB | Refills: 11 | Status: SHIPPED | OUTPATIENT
Start: 2019-09-26 | End: 2020-06-12 | Stop reason: SDUPTHER

## 2019-09-26 RX ORDER — ASPIRIN 81 MG/1
81 TABLET ORAL DAILY
Qty: 30 TAB | Refills: 11 | Status: SHIPPED | OUTPATIENT
Start: 2019-09-26 | End: 2020-06-12 | Stop reason: SDUPTHER

## 2020-06-12 ENCOUNTER — OFFICE VISIT (OUTPATIENT)
Dept: INTERNAL MEDICINE CLINIC | Age: 60
End: 2020-06-12

## 2020-06-12 VITALS
DIASTOLIC BLOOD PRESSURE: 80 MMHG | SYSTOLIC BLOOD PRESSURE: 128 MMHG | HEART RATE: 79 BPM | BODY MASS INDEX: 25.86 KG/M2 | WEIGHT: 140.5 LBS | TEMPERATURE: 98.1 F | OXYGEN SATURATION: 100 % | HEIGHT: 62 IN | RESPIRATION RATE: 16 BRPM

## 2020-06-12 DIAGNOSIS — I10 ESSENTIAL HYPERTENSION: Primary | ICD-10-CM

## 2020-06-12 DIAGNOSIS — I48.0 PAF (PAROXYSMAL ATRIAL FIBRILLATION) (HCC): ICD-10-CM

## 2020-06-12 DIAGNOSIS — R73.03 PREDIABETES: ICD-10-CM

## 2020-06-12 DIAGNOSIS — K58.9 IRRITABLE BOWEL SYNDROME, UNSPECIFIED TYPE: ICD-10-CM

## 2020-06-12 DIAGNOSIS — J84.9 INTERSTITIAL LUNG DISEASE (HCC): ICD-10-CM

## 2020-06-12 DIAGNOSIS — D86.9 SARCOIDOSIS: ICD-10-CM

## 2020-06-12 RX ORDER — OLMESARTAN MEDOXOMIL 40 MG/1
40 TABLET ORAL DAILY
Qty: 30 TAB | Refills: 11 | Status: SHIPPED | OUTPATIENT
Start: 2020-06-12 | End: 2020-10-14

## 2020-06-12 RX ORDER — ASPIRIN 81 MG/1
81 TABLET ORAL DAILY
Qty: 30 TAB | Refills: 11 | Status: SHIPPED | OUTPATIENT
Start: 2020-06-12

## 2020-06-12 RX ORDER — METOPROLOL SUCCINATE 25 MG/1
25 TABLET, EXTENDED RELEASE ORAL DAILY
Qty: 30 TAB | Refills: 11 | Status: SHIPPED | OUTPATIENT
Start: 2020-06-12 | End: 2021-07-31

## 2020-06-12 NOTE — PROGRESS NOTES
1. Have you been to the ER, urgent care clinic since your last visit? Hospitalized since your last visit? No    2. Have you seen or consulted any other health care providers outside of the [address] [place] [address] since your last visit? Include any pap smears or colon screening.  No     Want to discuss hot flashes

## 2020-06-12 NOTE — PROGRESS NOTES
SPORTS MEDICINE AND PRIMARY CARE  Lanie Leiva MD, [address] [address]  Phone:  [phone]  Fax: [phone]       Chief Complaint   Patient presents with    Annual Exam   .      SUBJECTIVE:    Rosa Humphreys is a 64 y.o. female Patient returns today for a yearly physical.  She has a known history of hypertension, IBS, interstitial lung disease, paroxysmal atrial fibrillation, prediabetes, sarcoidosis and is seen for evaluation. Except for hot flashes, she has no new complaints and has been doing well. Fortunately, during this COVID epidemic she is able to work from home. 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 Patient is seen for evaluation. Current Outpatient Medications   Medication Sig Dispense Refill    aspirin delayed-release 81 mg tablet Take 1 Tab by mouth daily. 30 Tab 11    olmesartan (BENICAR) 40 mg tablet Take 1 Tab by mouth daily. 30 Tab 11    metoprolol succinate (Toprol XL) 25 mg XL tablet Take 1 Tab by mouth daily.  27 Tab 11     Past Medical History:   Diagnosis Date    Hypertension     IBS (irritable bowel syndrome)     [facility] ([facility]     PAF (paroxysmal atrial fibrillation) ([facility] 07/23/2015    huang reyna md - discontinued NOAC    Prediabetes     Preventative health care     S/P colonoscopy 2013    Sarcoidosis     Sinus congestion     Tinnitus 06/04/2018     Past Surgical History:   Procedure Laterality Date    HX COLONOSCOPY      HX ENDOSCOPY      HX GYN       No Known Allergies      REVIEW OF SYSTEMS:  General: negative for - chills or fever  ENT: negative for - headaches, nasal congestion or tinnitus  Respiratory: negative for - cough, hemoptysis, shortness of breath or wheezing  Cardiovascular : negative for - chest pain, edema, palpitations or shortness of breath  Gastrointestinal: negative for - abdominal pain, blood in stools, heartburn or nausea/vomiting  Genito-Urinary: no dysuria, trouble voiding, or hematuria  Musculoskeletal: negative for - gait disturbance, joint pain, joint stiffness or joint swelling  Neurological: no TIA or stroke symptoms  Hematologic: no bruises, no bleeding, no swollen glands  Integument: no lumps, mole changes, nail changes or rash  Endocrine: no malaise/lethargy or unexpected weight changes      Social History     Socioeconomic History    Marital status:      Spouse name: Not on file    Number of children: Not on file    Years of education: Not on file    Highest education level: Not on file   Tobacco Use    Smoking status: Never Smoker    Smokeless tobacco: Never Used   Substance and Sexual Activity    Alcohol use: Yes     Alcohol/week: 1.0 standard drinks     Types: 1 Glasses of wine per week     Comment: occasional    Drug use: No    Sexual activity: Yes     Partners: Male     Birth control/protection: None   Social History Narrative    Family History: Mother: alive, HypertensionFather:  58 yrs, leukemia complicationsSister(s): alive, DMBrother(s): alive, ,    HTNGrandmother: HEENTGrandfather: Johnella Officer brother(s) , 1 sister(s) . 2 son 28truck . 21 works ith dad(s) 3 grands . Social History: Alcohol Use Patient uses alcohol, Drinks per occasion: 2, Drinks per w [place]: 2. Smoking Status Patient is a never smoker. Marital Status: , 11 yrs after 17 yr relationship. 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 Education/School: has highschool diploma. Family History   Problem Relation Age of Onset    Heart Disease Father        OBJECTIVE:    Visit Vitals  /80   Pulse 79   Temp 98.1 °F (36.7 °C) (Oral)   Resp 16   Ht 5' 2\" (1.575 m)   Wt 140 lb 8 oz (63.7 kg)   SpO2 100%   BMI 25.70 kg/m²     CONSTITUTIONAL: well , well nourished, appears age appropriate  EYES: perrla, eom intact  ENMT:moist mucous membranes, pharynx clear  NECK: supple.  Thyroid normal  RESPIRATORY: Chest: clear bilaterally   CARDIOVASCULAR: Heart: regular rate and rhythm  GASTROINTESTINAL: Abdomen: soft, bowel sounds active  HEMATOLOGIC: no pathological lymph nodes palpated  MUSCULOSKELETAL: Extremities: no edema, pulse 1+   INTEGUMENT: No unusual rashes or suspicious skin lesions noted. Nails appear normal.  NEUROLOGIC: non-focal exam   MENTAL STATUS: alert and oriented, appropriate affect           ASSESSMENT:  1. Essential hypertension    2. Interstitial lung disease ([facility]    3. Irritable bowel syndrome, unspecified type    4. Sarcoidosis    5. Prediabetes    6. PAF (paroxysmal atrial fibrillation) ([facility]      Patient's medical status is stable, she is doing well. She remains at her ideal body weight and we congratulate her. For her hot flashes we suggest vigorous activity for 30 minutes five days a week or black cohosh or soy silk to help her rest better at night. Blood pressure control is at goal, no titration is needed. We renew both of her blood pressure medicines, Olmesartan 40 mg daily, Metoprolol 25 mg q.h.s. Known history of interstitial lung disease, which is quiescent and related to sarcoidosis. IBS is quiescent. We will check hemoglobin A1c. There is a history of prediabetes, but we note her last hemoglobin A1c was normal, suggesting that the diagnosis is no longer present. There is also a history of paroxysmal atrial fibrillation and she is not on any medications for that diagnosis. She was under the care of Dr. Maycol Mix and at that time he was with [facility]. He discontinued the blood thinner. He told her that she no longer needed it. Patient has had no further episodes. She will return to our office in one year, sooner if she has any problems. I have discussed the diagnosis with the patient and the intended plan as seen in the  orders above. The patient understands and agees with the plan.   The patient has   received an after visit summary and questions were answered concerning  future plans  Patient labs and/or xrays were reviewed  Past records were reviewed. PLAN:  .  Orders Placed This Encounter    DANAE MAMMO BI SCREENING INCL CAD    HEMOGLOBIN A1C WITH EAG    URINALYSIS W/ RFLX MICROSCOPIC    CBC WITH AUTOMATED DIFF    METABOLIC PANEL, COMPREHENSIVE    LIPID PANEL    TSH 3RD GENERATION    aspirin delayed-release 81 mg tablet    olmesartan (BENICAR) 40 mg tablet    metoprolol succinate (Toprol XL) 25 mg XL tablet       Follow-up and Dispositions    · Return in about 1 year (around 6/12/2021). ATTENTION:   This medical record was transcribed using an electronic medical records system. Although proofread, it may and can contain electronic and spelling errors. Other human spelling and other errors may be present. Corrections may be executed at a later time. Please feel free to contact us for any clarifications as needed.

## 2020-06-12 NOTE — ASSESSMENT & PLAN NOTE
Stable, based on history, physical exam and review of pertinent labs, studies and medications; meds reconciled; continue current treatment plan. Key COPD Medications     Patient is on no COPD/Asthma meds.         Lab Results   Component Value Date/Time    WBC 5.7 06/07/2019 11:09 AM    HGB 13.6 06/07/2019 11:09 AM    HCT 41.1 06/07/2019 11:09 AM    PLATELET 243 83/97/1530 11:09 AM

## 2020-06-13 LAB
ALBUMIN SERPL-MCNC: 4.8 G/DL (ref 3.8–4.9)
ALBUMIN/GLOB SERPL: 1.7 {RATIO} (ref 1.2–2.2)
ALP SERPL-CCNC: 45 IU/L (ref 39–117)
ALT SERPL-CCNC: 10 IU/L (ref 0–32)
APPEARANCE UR: CLEAR
AST SERPL-CCNC: 23 IU/L (ref 0–40)
BACTERIA #/AREA URNS HPF: ABNORMAL /[HPF]
BASOPHILS # BLD AUTO: 0 X10E3/UL (ref 0–0.2)
BASOPHILS NFR BLD AUTO: 0 %
BILIRUB SERPL-MCNC: 0.5 MG/DL (ref 0–1.2)
BILIRUB UR QL STRIP: NEGATIVE
BUN SERPL-MCNC: 13 MG/DL (ref 6–24)
BUN/CREAT SERPL: 17 (ref 9–23)
CALCIUM SERPL-MCNC: 9.9 MG/DL (ref 8.7–10.2)
CASTS URNS MICRO: ABNORMAL
CASTS URNS QL MICRO: PRESENT /LPF
CHLORIDE SERPL-SCNC: 103 MMOL/L (ref 96–106)
CHOLEST SERPL-MCNC: 260 MG/DL (ref 100–199)
CO2 SERPL-SCNC: 22 MMOL/L (ref 20–29)
COLOR UR: YELLOW
CREAT SERPL-MCNC: 0.76 MG/DL (ref 0.57–1)
EOSINOPHIL # BLD AUTO: 0.1 X10E3/UL (ref 0–0.4)
EOSINOPHIL NFR BLD AUTO: 1 %
EPI CELLS #/AREA URNS HPF: ABNORMAL /HPF (ref 0–10)
ERYTHROCYTE [DISTWIDTH] IN BLOOD BY AUTOMATED COUNT: 12.3 % (ref 11.7–15.4)
EST. AVERAGE GLUCOSE BLD GHB EST-MCNC: 100 MG/DL
GLOBULIN SER CALC-MCNC: 2.8 G/DL (ref 1.5–4.5)
GLUCOSE SERPL-MCNC: 87 MG/DL (ref 65–99)
GLUCOSE UR QL: NEGATIVE
HBA1C MFR BLD: 5.1 % (ref 4.8–5.6)
HCT VFR BLD AUTO: 37.7 % (ref 34–46.6)
HDLC SERPL-MCNC: 115 MG/DL
HGB BLD-MCNC: 12.6 G/DL (ref 11.1–15.9)
HGB UR QL STRIP: NEGATIVE
IMM GRANULOCYTES # BLD AUTO: 0 X10E3/UL (ref 0–0.1)
IMM GRANULOCYTES NFR BLD AUTO: 0 %
KETONES UR QL STRIP: NEGATIVE
LDLC SERPL CALC-MCNC: 131 MG/DL (ref 0–99)
LEUKOCYTE ESTERASE UR QL STRIP: ABNORMAL
LYMPHOCYTES # BLD AUTO: 1.1 X10E3/UL (ref 0.7–3.1)
LYMPHOCYTES NFR BLD AUTO: 21 %
MCH RBC QN AUTO: 34.1 PG (ref 26.6–33)
MCHC RBC AUTO-ENTMCNC: 33.4 G/DL (ref 31.5–35.7)
MCV RBC AUTO: 102 FL (ref 79–97)
MICRO URNS: ABNORMAL
MONOCYTES # BLD AUTO: 0.5 X10E3/UL (ref 0.1–0.9)
MONOCYTES NFR BLD AUTO: 8 %
MUCOUS THREADS URNS QL MICRO: PRESENT
NEUTROPHILS # BLD AUTO: 3.8 X10E3/UL (ref 1.4–7)
NEUTROPHILS NFR BLD AUTO: 70 %
NITRITE UR QL STRIP: NEGATIVE
PH UR STRIP: 5 [PH] (ref 5–7.5)
PLATELET # BLD AUTO: 270 X10E3/UL (ref 150–450)
POTASSIUM SERPL-SCNC: 4.6 MMOL/L (ref 3.5–5.2)
PROT SERPL-MCNC: 7.6 G/DL (ref 6–8.5)
PROT UR QL STRIP: NEGATIVE
RBC # BLD AUTO: 3.7 X10E6/UL (ref 3.77–5.28)
RBC #/AREA URNS HPF: ABNORMAL /HPF (ref 0–2)
SODIUM SERPL-SCNC: 141 MMOL/L (ref 134–144)
SP GR UR: 1.02 (ref 1–1.03)
TRIGL SERPL-MCNC: 69 MG/DL (ref 0–149)
TSH SERPL DL<=0.005 MIU/L-ACNC: 1.84 UIU/ML (ref 0.45–4.5)
UROBILINOGEN UR STRIP-MCNC: 0.2 MG/DL (ref 0.2–1)
VLDLC SERPL CALC-MCNC: 14 MG/DL (ref 5–40)
WBC # BLD AUTO: 5.5 X10E3/UL (ref 3.4–10.8)
WBC #/AREA URNS HPF: ABNORMAL /HPF (ref 0–5)

## 2020-10-14 RX ORDER — OLMESARTAN MEDOXOMIL 40 MG/1
TABLET ORAL
Qty: 30 TAB | Refills: 11 | Status: SHIPPED | OUTPATIENT
Start: 2020-10-14 | End: 2021-10-27 | Stop reason: SDUPTHER

## 2021-07-01 ENCOUNTER — OFFICE VISIT (OUTPATIENT)
Dept: INTERNAL MEDICINE CLINIC | Age: 61
End: 2021-07-01
Payer: COMMERCIAL

## 2021-07-01 VITALS
WEIGHT: 141.2 LBS | SYSTOLIC BLOOD PRESSURE: 135 MMHG | HEART RATE: 77 BPM | BODY MASS INDEX: 25.98 KG/M2 | TEMPERATURE: 98.5 F | HEIGHT: 62 IN | OXYGEN SATURATION: 99 % | DIASTOLIC BLOOD PRESSURE: 79 MMHG | RESPIRATION RATE: 18 BRPM

## 2021-07-01 DIAGNOSIS — J84.9 INTERSTITIAL LUNG DISEASE (HCC): ICD-10-CM

## 2021-07-01 DIAGNOSIS — Z00.00 PREVENTATIVE HEALTH CARE: Primary | ICD-10-CM

## 2021-07-01 DIAGNOSIS — D86.9 SARCOIDOSIS: ICD-10-CM

## 2021-07-01 DIAGNOSIS — I48.0 PAF (PAROXYSMAL ATRIAL FIBRILLATION) (HCC): ICD-10-CM

## 2021-07-01 DIAGNOSIS — I10 ESSENTIAL HYPERTENSION: ICD-10-CM

## 2021-07-01 DIAGNOSIS — K58.9 IRRITABLE BOWEL SYNDROME, UNSPECIFIED TYPE: ICD-10-CM

## 2021-07-01 PROCEDURE — 99396 PREV VISIT EST AGE 40-64: CPT | Performed by: INTERNAL MEDICINE

## 2021-07-01 RX ORDER — TRIAMCINOLONE ACETONIDE 1 MG/G
CREAM TOPICAL
Qty: 28.4 G | Refills: 3 | Status: SHIPPED | OUTPATIENT
Start: 2021-07-01

## 2021-07-01 NOTE — PROGRESS NOTES
SPORTS MEDICINE AND PRIMARY CARE  Tobi Bates MD, [address] [address]  Phone:  [phone]  Fax: [phone]       Chief Complaint   Patient presents with    Physical   .      SUBJECTIVE:    Asha Babcock is a 63 y.o. female Since we last saw her, she was seen by Santana Blair for palpitations and was found to have atypical atrial flutter, primary hypertension, and has a history of IBS, interstitial lung disease, paroxysmal atrial fibrillation, impaired glucose tolerance and sarcoidosis. Dr. Sol Rao apparently stopped the Eliquis. She states every once in a while she will have a palpitation. Two areas on both hands that are certainly pruritic and tiny. Patient also complains of  and is seen for evaluation. Current Outpatient Medications   Medication Sig Dispense Refill    olmesartan (BENICAR) 40 mg tablet TAKE 1 TABLET BY MOUTH DAILY 30 Tab 11    aspirin delayed-release 81 mg tablet Take 1 Tab by mouth daily. 30 Tab 11    metoprolol succinate (Toprol XL) 25 mg XL tablet Take 1 Tab by mouth daily.  27 Tab 11     Past Medical History:   Diagnosis Date    Hypertension     IBS (irritable bowel syndrome)     [facility]     PAF (paroxysmal atrial fibrillation) ([facility] 07/23/2015    huang reyna md - discontinued NOAC    Prediabetes     Preventative health care     S/P colonoscopy 2013    Sarcoidosis     Sinus congestion     Tinnitus 06/04/2018     Past Surgical History:   Procedure Laterality Date    HX COLONOSCOPY      HX ENDOSCOPY      HX GYN       No Known Allergies      REVIEW OF SYSTEMS:  General: negative for - chills or fever  ENT: negative for - headaches, nasal congestion or tinnitus  Respiratory: negative for - cough, hemoptysis, shortness of breath or wheezing  Cardiovascular : negative for - chest pain, edema, palpitations or shortness of breath  Gastrointestinal: negative for - abdominal pain, blood in stools, heartburn or nausea/vomiting  Genito-Urinary: no dysuria, trouble voiding, or hematuria  Musculoskeletal: negative for - gait disturbance, joint pain, joint stiffness or joint swelling  Neurological: no TIA or stroke symptoms  Hematologic: no bruises, no bleeding, no swollen glands  Integument: no lumps, mole changes, nail changes or rash  Endocrine: no malaise/lethargy or unexpected weight changes      Social History     Socioeconomic History    Marital status:      Spouse name: Not on file    Number of children: Not on file    Years of education: Not on file    Highest education level: Not on file   Tobacco Use    Smoking status: Never Smoker    Smokeless tobacco: Never Used   Substance and Sexual Activity    Alcohol use: Yes     Alcohol/week: 1.0 standard drinks     Types: 1 Glasses of wine per week     Comment: occasional    Drug use: No    Sexual activity: Yes     Partners: Male     Birth control/protection: None   Social History Narrative    Family History: Mother: [de-identified] alive, HypertensionFather:  58 yrs, leukemia complicationsSister(s): alive, DMBrother(s): alive, ,    HTNGrandmother: HEENTGrandfather: Eliana Ying brother(s) , 1 sister(s) . 2 son 32 mental illness 21 works doing houses (s) 3 grands . Social History: Alcohol Use Patient uses alcohol, Drinks per occasion: 2, Drinks per w [place]: 2. Smoking Status Patient is a never smoker. Marital Status: , 11 yrs after 17 yr relationship. 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 Social Determinants of Health     Financial Resource Strain:     Difficulty of Paying Living Expenses:    Food Insecurity:     Worried About Running Out of Food in the Last Year:     [address] in the Last Year:    Transportation Needs:     Lack of Transportation (Medical):      Lack of Transportation (Non-Medical):    Physical Activity:     Days of Exercise per Week:     Minutes of Exercise per Session:    Stress:     Feeling of Stress :    Social Connections:     Frequency of Communication with Friends and Family:     Frequency of Social Gatherings with Friends and Family:     Attends Taoist Services:     Active Member of Clubs or Organizations:     Attends Club or Organization Meetings:     Marital Status:      Family History   Problem Relation Age of Onset    Heart Disease Father        OBJECTIVE:    Visit Vitals  /79 (BP 1 Location: Left upper arm, BP Patient Position: Sitting)   Pulse 77   Temp 98.5 °F (36.9 °C) (Oral)   Resp 18   Ht 5' 2\" (1.575 m)   Wt 141 lb 3.2 oz (64 kg)   SpO2 99%   BMI 25.83 kg/m²     CONSTITUTIONAL: well , well nourished, appears age appropriate  EYES: perrla, eom intact  ENMT:moist mucous membranes, pharynx clear  NECK: supple. Thyroid normal  RESPIRATORY: Chest: clear bilaterally   CARDIOVASCULAR: Heart: regular rate and rhythm  GASTROINTESTINAL: Abdomen: soft, bowel sounds active  HEMATOLOGIC: no pathological lymph nodes palpated  MUSCULOSKELETAL: Extremities: no edema, pulse 1+   INTEGUMENT: No unusual rashes or suspicious skin lesions noted. Nails appear normal.  NEUROLOGIC: non-focal exam   MENTAL STATUS: alert and oriented, appropriate affect           ASSESSMENT:  1. Preventative health care    2. Essential hypertension    3. PAF (paroxysmal atrial fibrillation) ([address]    4. Interstitial lung disease ([address]    5. Irritable bowel syndrome, unspecified type    6. Sarcoidosis      Patient's medical status is stable, blood pressure control is at goal.      History of paroxysmal atrial fibrillation, apparently not an issue now, under the care of an EP cardiologist.      Interstitial lung disease related to sarcoidosis is stable. No symptoms related to IBS. This will complete a preventive healthcare visit. We encourage her to see us at least once a year.   She is at her ideal body weight and we encourage physical activity 30 minutes five days a week, which may help some of her stressors. I have discussed the diagnosis with the patient and the intended plan as seen in the  Orders. The patient understands and agees with the plan. The patient has   received an after visit summary and questions were answered concerning  future plans  Patient labs and/or xrays were reviewed  Past records were reviewed. PLAN:  .  Orders Placed This Encounter    URINALYSIS W/ RFLX MICROSCOPIC    CBC WITH AUTOMATED DIFF    METABOLIC PANEL, COMPREHENSIVE    LIPID PANEL    TSH 3RD GENERATION    HEMOGLOBIN A1C WITH EAG       Follow-up and Dispositions    · Return in about 1 year (around 7/1/2022). ATTENTION:   This medical record was transcribed using an electronic medical records system. Although proofread, it may and can contain electronic and spelling errors. Other human spelling and other errors may be present. Corrections may be executed at a later time. Please feel free to contact us for any clarifications as needed.

## 2021-07-01 NOTE — PROGRESS NOTES
Donaldo Arnold is a 63 y.o. female    Chief Complaint   Patient presents with    Physical     1. Have you been to the ER, urgent care clinic since your last visit? Hospitalized since your last visit? No      2. Have you seen or consulted any other health care providers outside of the [address] [place] [address] since your last visit? Include any pap smears or colon screening.   No

## 2021-07-02 LAB
ALBUMIN SERPL-MCNC: 4.1 G/DL (ref 3.5–5)
ALBUMIN/GLOB SERPL: 1.2 {RATIO} (ref 1.1–2.2)
ALP SERPL-CCNC: 56 U/L (ref 45–117)
ALT SERPL-CCNC: 18 U/L (ref 12–78)
ANION GAP SERPL CALC-SCNC: 5 MMOL/L (ref 5–15)
AST SERPL-CCNC: 16 U/L (ref 15–37)
BASOPHILS # BLD: 0 K/UL (ref 0–0.1)
BASOPHILS NFR BLD: 0 % (ref 0–1)
BILIRUB SERPL-MCNC: 0.5 MG/DL (ref 0.2–1)
BUN SERPL-MCNC: 13 MG/DL (ref 6–20)
BUN/CREAT SERPL: 16 (ref 12–20)
CALCIUM SERPL-MCNC: 9.2 MG/DL (ref 8.5–10.1)
CHLORIDE SERPL-SCNC: 103 MMOL/L (ref 97–108)
CHOLEST SERPL-MCNC: 217 MG/DL
CO2 SERPL-SCNC: 28 MMOL/L (ref 21–32)
CREAT SERPL-MCNC: 0.82 MG/DL (ref 0.55–1.02)
DIFFERENTIAL METHOD BLD: ABNORMAL
EOSINOPHIL # BLD: 0.1 K/UL (ref 0–0.4)
EOSINOPHIL NFR BLD: 1 % (ref 0–7)
ERYTHROCYTE [DISTWIDTH] IN BLOOD BY AUTOMATED COUNT: 11.9 % (ref 11.5–14.5)
EST. AVERAGE GLUCOSE BLD GHB EST-MCNC: 100 MG/DL
GLOBULIN SER CALC-MCNC: 3.5 G/DL (ref 2–4)
GLUCOSE SERPL-MCNC: 89 MG/DL (ref 65–100)
HBA1C MFR BLD: 5.1 % (ref 4–5.6)
HCT VFR BLD AUTO: 41.2 % (ref 35–47)
HDLC SERPL-MCNC: 140 MG/DL
HDLC SERPL: 1.6 {RATIO} (ref 0–5)
HGB BLD-MCNC: 13.1 G/DL (ref 11.5–16)
IMM GRANULOCYTES # BLD AUTO: 0 K/UL (ref 0–0.04)
IMM GRANULOCYTES NFR BLD AUTO: 1 % (ref 0–0.5)
LDLC SERPL CALC-MCNC: 63.8 MG/DL (ref 0–100)
LYMPHOCYTES # BLD: 1.6 K/UL (ref 0.8–3.5)
LYMPHOCYTES NFR BLD: 26 % (ref 12–49)
MCH RBC QN AUTO: 33.8 PG (ref 26–34)
MCHC RBC AUTO-ENTMCNC: 31.8 G/DL (ref 30–36.5)
MCV RBC AUTO: 106.2 FL (ref 80–99)
MONOCYTES # BLD: 0.6 K/UL (ref 0–1)
MONOCYTES NFR BLD: 10 % (ref 5–13)
NEUTS SEG # BLD: 3.9 K/UL (ref 1.8–8)
NEUTS SEG NFR BLD: 62 % (ref 32–75)
NRBC # BLD: 0 K/UL (ref 0–0.01)
NRBC BLD-RTO: 0 PER 100 WBC
PLATELET # BLD AUTO: 263 K/UL (ref 150–400)
PMV BLD AUTO: 9.4 FL (ref 8.9–12.9)
POTASSIUM SERPL-SCNC: 4.5 MMOL/L (ref 3.5–5.1)
PROT SERPL-MCNC: 7.6 G/DL (ref 6.4–8.2)
RBC # BLD AUTO: 3.88 M/UL (ref 3.8–5.2)
SODIUM SERPL-SCNC: 136 MMOL/L (ref 136–145)
TRIGL SERPL-MCNC: 66 MG/DL (ref ?–150)
TSH SERPL DL<=0.05 MIU/L-ACNC: 1.49 UIU/ML (ref 0.36–3.74)
VLDLC SERPL CALC-MCNC: 13.2 MG/DL
WBC # BLD AUTO: 6.2 K/UL (ref 3.6–11)

## 2021-07-02 NOTE — PROGRESS NOTES
Your cholesterol was mildly elevated. Continue to follow a heart healthy or low-cholesterol diet.   The remainder of the studies are acceptable  Blessings

## 2021-07-31 RX ORDER — METOPROLOL SUCCINATE 25 MG/1
TABLET, EXTENDED RELEASE ORAL
Qty: 30 TABLET | Refills: 11 | Status: SHIPPED | OUTPATIENT
Start: 2021-07-31 | End: 2022-09-21 | Stop reason: SDUPTHER

## 2021-10-27 RX ORDER — OLMESARTAN MEDOXOMIL 40 MG/1
40 TABLET ORAL DAILY
Qty: 30 TABLET | Refills: 11 | Status: SHIPPED | OUTPATIENT
Start: 2021-10-27 | End: 2022-11-03 | Stop reason: SDUPTHER

## 2022-03-18 PROBLEM — H93.19 TINNITUS: Status: ACTIVE | Noted: 2018-06-04

## 2022-11-03 ENCOUNTER — OFFICE VISIT (OUTPATIENT)
Dept: INTERNAL MEDICINE CLINIC | Age: 62
End: 2022-11-03
Payer: COMMERCIAL

## 2022-11-03 VITALS
BODY MASS INDEX: 24.37 KG/M2 | WEIGHT: 132.4 LBS | OXYGEN SATURATION: 100 % | TEMPERATURE: 98.6 F | RESPIRATION RATE: 20 BRPM | SYSTOLIC BLOOD PRESSURE: 126 MMHG | DIASTOLIC BLOOD PRESSURE: 78 MMHG | HEART RATE: 80 BPM | HEIGHT: 62 IN

## 2022-11-03 DIAGNOSIS — R73.02 IGT (IMPAIRED GLUCOSE TOLERANCE): ICD-10-CM

## 2022-11-03 DIAGNOSIS — I48.0 PAF (PAROXYSMAL ATRIAL FIBRILLATION) (HCC): ICD-10-CM

## 2022-11-03 DIAGNOSIS — K58.9 IRRITABLE BOWEL SYNDROME, UNSPECIFIED TYPE: ICD-10-CM

## 2022-11-03 DIAGNOSIS — I10 PRIMARY HYPERTENSION: ICD-10-CM

## 2022-11-03 DIAGNOSIS — D86.9 SARCOIDOSIS: ICD-10-CM

## 2022-11-03 DIAGNOSIS — Z00.00 PREVENTATIVE HEALTH CARE: Primary | ICD-10-CM

## 2022-11-03 DIAGNOSIS — J84.9 INTERSTITIAL LUNG DISEASE (HCC): ICD-10-CM

## 2022-11-03 PROCEDURE — 3074F SYST BP LT 130 MM HG: CPT | Performed by: INTERNAL MEDICINE

## 2022-11-03 PROCEDURE — 99396 PREV VISIT EST AGE 40-64: CPT | Performed by: INTERNAL MEDICINE

## 2022-11-03 PROCEDURE — 3078F DIAST BP <80 MM HG: CPT | Performed by: INTERNAL MEDICINE

## 2022-11-03 RX ORDER — OLMESARTAN MEDOXOMIL 40 MG/1
40 TABLET ORAL DAILY
Qty: 30 TABLET | Refills: 11 | Status: SHIPPED | OUTPATIENT
Start: 2022-11-03

## 2022-11-03 NOTE — PROGRESS NOTES
SPORTS MEDICINE AND PRIMARY CARE  Ector Bello MD, [address] [address]  Phone:  [phone]  Fax: [phone]       Chief Complaint   Patient presents with    Complete Physical   .      SUBJECTIVE:    Sharona Fuentes is a 63 y.o. female Followed by Dr. Luiza Schwartz for atypical atrial flutter. He wanted to put a prolonged monitor on her, but apparently insurance will not pay for it. He has her on Metoprolol. She has a history of paroxysmal atrial fibrillation. He discontinued the NOAC. She also has a history of IBS, interstitial lung disease, sarcoidosis, prediabetes, and comes in for a preventive healthcare visit. She voices no new complaints and is seen for evaluation. Current Outpatient Medications   Medication Sig Dispense Refill    olmesartan (BENICAR) 40 mg tablet Take 1 Tablet by mouth daily. 30 Tablet 11    metoprolol succinate (TOPROL-XL) 25 mg XL tablet Take 1 Tablet by mouth daily. 30 Tablet 1    triamcinolone acetonide (KENALOG) 0.1 % topical cream Apply  to affected area three (3) times daily as needed for Skin Irritation. 28.4 g 3    aspirin delayed-release 81 mg tablet Take 1 Tab by mouth daily.  27 Tab 11     Past Medical History:   Diagnosis Date    Hypertension     IBS (irritable bowel syndrome)     Interstitial lung disease (HCC)     [facility] 07/23/2015    huang reyna md - discontinued NOAC    Prediabetes     Preventative health care     S/P colonoscopy 2013    Sarcoidosis     Sinus congestion     Tinnitus 06/04/2018     Past Surgical History:   Procedure Laterality Date    HX COLONOSCOPY      HX ENDOSCOPY      HX GYN       No Known Allergies      REVIEW OF SYSTEMS:  General: negative for - chills or fever  ENT: negative for - headaches, nasal congestion or tinnitus  Respiratory: negative for - cough, hemoptysis, shortness of breath or wheezing  Cardiovascular : negative for - chest pain, edema, palpitations or shortness of breath  Gastrointestinal: negative for - abdominal pain, blood in stools, heartburn or nausea/vomiting  Genito-Urinary: no dysuria, trouble voiding, or hematuria  Musculoskeletal: negative for - gait disturbance, joint pain, joint stiffness or joint swelling  Neurological: no TIA or stroke symptoms  Hematologic: no bruises, no bleeding, no swollen glands  Integument: no lumps, mole changes, nail changes or rash  Endocrine: no malaise/lethargy or unexpected weight changes      Social History     Socioeconomic History    Marital status:    Tobacco Use    Smoking status: Never    Smokeless tobacco: Never   Substance and Sexual Activity    Alcohol use: Yes     Alcohol/week: 1.0 standard drink     Types: 1 Glasses of wine per week     Comment: occasional    Drug use: No    Sexual activity: Yes     Partners: Male     Birth control/protection: None   Social History Narrative    Family History: Mother: [de-identified] alive, HypertensionFather:  58 yrs, leukemia complicationsSister(s): alive, DMBrother(s): alive, ,    HTNGrandmother: HEENTGrandfather: Ival Roll brother(s) , 1 sister(s) . 2 son 32 mental illness 21 works doing houses (s) 3 grands . Social History: Alcohol Use Patient uses alcohol, Drinks per occasion: 2, Drinks per w [place]: 2. Smoking Status Patient is a never smoker. Marital Status: , 11 yrs after 17 yr relationship. 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 Education/School: has highschool diploma.      Family History   Problem Relation Age of Onset    Heart Disease Father        OBJECTIVE:    Visit Vitals  /78 (BP 1 Location: Right arm, BP Patient Position: Sitting)   Pulse 80   Temp 98.6 °F (37 °C) (Oral)   Resp 20   Ht 5' 2\" (1.575 m)   Wt 132 lb 6.4 oz (60.1 kg)   SpO2 100%   BMI 24.22 kg/m²     CONSTITUTIONAL: well , well nourished, appears age appropriate  EYES: perrla, eom intact  ENMT:moist mucous membranes, pharynx clear  NECK: supple. Thyroid normal  RESPIRATORY: Chest: clear bilaterally   CARDIOVASCULAR: Heart: regular rate and rhythm  GASTROINTESTINAL: Abdomen: soft, bowel sounds active  HEMATOLOGIC: no pathological lymph nodes palpated  MUSCULOSKELETAL: Extremities: no edema, pulse 1+   INTEGUMENT: No unusual rashes or suspicious skin lesions noted. Nails appear normal.  NEUROLOGIC: non-focal exam   MENTAL STATUS: alert and oriented, appropriate affect           ASSESSMENT:  1. Preventative health care    2. Irritable bowel syndrome, unspecified type    3. Interstitial lung disease ([facility]    4. IGT (impaired glucose tolerance)    5. PAF (paroxysmal atrial fibrillation) ([facility]    6. Sarcoidosis    7. Primary hypertension        She has a mammogram coming up and pap smear. IBS is quiescent. Interstitial lung disease is asymptomatic. It has been five years since it has been evaluated. We will ask for PFTs, as well as repeat CT scan for its evaluation. She has IGT and we will check hemoglobin A1c. On auscultation she is in sinus rhythm. I suspect interstitial lung disease is related to sarcoidosis, which is quiescent. Blood pressure control is at goal.    She will be back to see me in a year. Encouraged her to communicate with us by [address] I have discussed the diagnosis with the patient and the intended plan as seen in the  Orders. The patient understands and agees with the plan. The patient has   received an after visit summary and questions were answered concerning  future plans  Patient labs and/or xrays were reviewed  Past records were reviewed. PLAN:  .  Orders Placed This Encounter    CT CHEST WO CONT    olmesartan (BENICAR) 40 mg tablet       Follow-up and Dispositions    Return in about 1 year (around 11/3/2023). ATTENTION:   This medical record was transcribed using an electronic medical records system.   Although proofread, it may and can contain electronic and spelling errors. Other human spelling and other errors may be present. Corrections may be executed at a later time. Please feel free to contact us for any clarifications as needed.

## 2022-11-03 NOTE — PROGRESS NOTES
Ulysses Brush is a 63 y.o. female    Chief Complaint   Patient presents with    Complete Physical     1. Have you been to the ER, urgent care clinic since your last visit? Hospitalized since your last visit? No    2. Have you seen or consulted any other health care providers outside of the [address] [place] [address] since your last visit? Include any pap smears or colon screening.  No

## 2022-11-04 NOTE — PROGRESS NOTES
Patient comes in today for a physical examination. Since we last saw him, he was seen by Rosalba Randolph at [address] [place] [address] for atypical atrial flutter with a history of TIA. Extended recording revealed sinus rhythm with PACs and PVCs, but no atrial fibrillation. Patient comes in today with other medical problems including IBS, interstitial lung disease, impaired glucose tolerance, sarcoidosis and primary hypertension.

## 2023-01-03 RX ORDER — METOPROLOL SUCCINATE 25 MG/1
25 TABLET, EXTENDED RELEASE ORAL DAILY
Qty: 30 TABLET | Refills: 1 | Status: SHIPPED | OUTPATIENT
Start: 2023-01-03

## 2023-05-22 RX ORDER — ASPIRIN 81 MG/1
81 TABLET ORAL DAILY
COMMUNITY
Start: 2020-06-12

## 2023-05-22 RX ORDER — METOPROLOL SUCCINATE 25 MG/1
25 TABLET, EXTENDED RELEASE ORAL DAILY
COMMUNITY
Start: 2023-01-03

## 2023-05-22 RX ORDER — OLMESARTAN MEDOXOMIL 40 MG/1
40 TABLET ORAL DAILY
COMMUNITY
Start: 2022-11-03

## 2023-05-22 RX ORDER — TRIAMCINOLONE ACETONIDE 1 MG/G
CREAM TOPICAL 3 TIMES DAILY PRN
COMMUNITY
Start: 2021-07-01